# Patient Record
Sex: FEMALE | Race: WHITE | NOT HISPANIC OR LATINO | Employment: UNEMPLOYED | URBAN - NONMETROPOLITAN AREA
[De-identification: names, ages, dates, MRNs, and addresses within clinical notes are randomized per-mention and may not be internally consistent; named-entity substitution may affect disease eponyms.]

---

## 2020-12-29 ENCOUNTER — HOSPITAL ENCOUNTER (OUTPATIENT)
Dept: LAB | Facility: MEDICAL CENTER | Age: 25
End: 2020-12-29
Attending: REGISTERED NURSE
Payer: MEDICAID

## 2020-12-29 LAB
ALBUMIN SERPL BCP-MCNC: 4.4 G/DL (ref 3.2–4.9)
ALBUMIN/GLOB SERPL: 1.6 G/DL
ALP SERPL-CCNC: 86 U/L (ref 30–99)
ALT SERPL-CCNC: 12 U/L (ref 2–50)
ANION GAP SERPL CALC-SCNC: 9 MMOL/L (ref 7–16)
AST SERPL-CCNC: 13 U/L (ref 12–45)
BASOPHILS # BLD AUTO: 0.4 % (ref 0–1.8)
BASOPHILS # BLD: 0.03 K/UL (ref 0–0.12)
BILIRUB SERPL-MCNC: 0.3 MG/DL (ref 0.1–1.5)
BUN SERPL-MCNC: 10 MG/DL (ref 8–22)
CALCIUM SERPL-MCNC: 9.3 MG/DL (ref 8.5–10.5)
CHLORIDE SERPL-SCNC: 107 MMOL/L (ref 96–112)
CHOLEST SERPL-MCNC: 183 MG/DL (ref 100–199)
CO2 SERPL-SCNC: 25 MMOL/L (ref 20–33)
CREAT SERPL-MCNC: 0.77 MG/DL (ref 0.5–1.4)
EOSINOPHIL # BLD AUTO: 0.13 K/UL (ref 0–0.51)
EOSINOPHIL NFR BLD: 1.9 % (ref 0–6.9)
ERYTHROCYTE [DISTWIDTH] IN BLOOD BY AUTOMATED COUNT: 42 FL (ref 35.9–50)
FASTING STATUS PATIENT QL REPORTED: NORMAL
GLOBULIN SER CALC-MCNC: 2.7 G/DL (ref 1.9–3.5)
GLUCOSE SERPL-MCNC: 78 MG/DL (ref 65–99)
HCT VFR BLD AUTO: 40.7 % (ref 37–47)
HDLC SERPL-MCNC: 42 MG/DL
HGB BLD-MCNC: 13.5 G/DL (ref 12–16)
IMM GRANULOCYTES # BLD AUTO: 0.01 K/UL (ref 0–0.11)
IMM GRANULOCYTES NFR BLD AUTO: 0.1 % (ref 0–0.9)
LDLC SERPL CALC-MCNC: 99 MG/DL
LYMPHOCYTES # BLD AUTO: 1.81 K/UL (ref 1–4.8)
LYMPHOCYTES NFR BLD: 26.3 % (ref 22–41)
MCH RBC QN AUTO: 29.7 PG (ref 27–33)
MCHC RBC AUTO-ENTMCNC: 33.2 G/DL (ref 33.6–35)
MCV RBC AUTO: 89.5 FL (ref 81.4–97.8)
MONOCYTES # BLD AUTO: 0.48 K/UL (ref 0–0.85)
MONOCYTES NFR BLD AUTO: 7 % (ref 0–13.4)
NEUTROPHILS # BLD AUTO: 4.42 K/UL (ref 2–7.15)
NEUTROPHILS NFR BLD: 64.3 % (ref 44–72)
NRBC # BLD AUTO: 0 K/UL
NRBC BLD-RTO: 0 /100 WBC
PLATELET # BLD AUTO: 268 K/UL (ref 164–446)
PMV BLD AUTO: 10.1 FL (ref 9–12.9)
POTASSIUM SERPL-SCNC: 4.4 MMOL/L (ref 3.6–5.5)
PROT SERPL-MCNC: 7.1 G/DL (ref 6–8.2)
RBC # BLD AUTO: 4.55 M/UL (ref 4.2–5.4)
SODIUM SERPL-SCNC: 141 MMOL/L (ref 135–145)
TRIGL SERPL-MCNC: 212 MG/DL (ref 0–149)
TSH SERPL DL<=0.005 MIU/L-ACNC: 2.37 UIU/ML (ref 0.38–5.33)
WBC # BLD AUTO: 6.9 K/UL (ref 4.8–10.8)

## 2020-12-29 PROCEDURE — 85025 COMPLETE CBC W/AUTO DIFF WBC: CPT

## 2020-12-29 PROCEDURE — 84443 ASSAY THYROID STIM HORMONE: CPT

## 2020-12-29 PROCEDURE — 36415 COLL VENOUS BLD VENIPUNCTURE: CPT

## 2020-12-29 PROCEDURE — 80061 LIPID PANEL: CPT

## 2020-12-29 PROCEDURE — 80053 COMPREHEN METABOLIC PANEL: CPT

## 2020-12-30 ENCOUNTER — HOSPITAL ENCOUNTER (INPATIENT)
Dept: HOSPITAL 8 - 3E | Age: 25
LOS: 11 days | Discharge: HOME | DRG: 753 | End: 2021-01-10
Attending: PSYCHIATRY & NEUROLOGY | Admitting: PSYCHIATRY & NEUROLOGY
Payer: MEDICAID

## 2020-12-30 VITALS — DIASTOLIC BLOOD PRESSURE: 85 MMHG | SYSTOLIC BLOOD PRESSURE: 139 MMHG

## 2020-12-30 VITALS — DIASTOLIC BLOOD PRESSURE: 75 MMHG | SYSTOLIC BLOOD PRESSURE: 105 MMHG

## 2020-12-30 VITALS — WEIGHT: 174.83 LBS | BODY MASS INDEX: 23.68 KG/M2 | HEIGHT: 72 IN

## 2020-12-30 DIAGNOSIS — R45.851: ICD-10-CM

## 2020-12-30 DIAGNOSIS — G47.00: ICD-10-CM

## 2020-12-30 DIAGNOSIS — E78.5: ICD-10-CM

## 2020-12-30 DIAGNOSIS — F41.1: ICD-10-CM

## 2020-12-30 DIAGNOSIS — F31.30: Primary | ICD-10-CM

## 2020-12-30 DIAGNOSIS — Z59.0: ICD-10-CM

## 2020-12-30 DIAGNOSIS — Z79.899: ICD-10-CM

## 2020-12-30 DIAGNOSIS — J45.909: ICD-10-CM

## 2020-12-30 LAB — MICROSCOPIC: (no result)

## 2020-12-30 PROCEDURE — 36415 COLL VENOUS BLD VENIPUNCTURE: CPT

## 2020-12-30 PROCEDURE — 71045 X-RAY EXAM CHEST 1 VIEW: CPT

## 2020-12-30 PROCEDURE — 84443 ASSAY THYROID STIM HORMONE: CPT

## 2020-12-30 PROCEDURE — 80061 LIPID PANEL: CPT

## 2020-12-30 PROCEDURE — 93005 ELECTROCARDIOGRAM TRACING: CPT

## 2020-12-30 PROCEDURE — 81001 URINALYSIS AUTO W/SCOPE: CPT

## 2020-12-30 PROCEDURE — 84439 ASSAY OF FREE THYROXINE: CPT

## 2020-12-30 PROCEDURE — 87086 URINE CULTURE/COLONY COUNT: CPT

## 2020-12-30 RX ADMIN — QUETIAPINE FUMARATE SCH MG: 100 TABLET ORAL at 20:57

## 2020-12-30 SDOH — ECONOMIC STABILITY - HOUSING INSECURITY: HOMELESSNESS: Z59.0

## 2020-12-31 VITALS — DIASTOLIC BLOOD PRESSURE: 77 MMHG | SYSTOLIC BLOOD PRESSURE: 108 MMHG

## 2020-12-31 VITALS — DIASTOLIC BLOOD PRESSURE: 64 MMHG | SYSTOLIC BLOOD PRESSURE: 93 MMHG

## 2020-12-31 LAB
CHOL/HDL RATIO: 3.9
HDL CHOL %: 25 % (ref 28–40)
HDL CHOLESTEROL (DIRECT): 51 MG/DL (ref 40–60)
LDL CHOLESTEROL,CALCULATED: 125 MG/DL (ref 54–169)
LDLC/HDLC SERPL: 2.5 {RATIO} (ref 0.5–3)
T4 FREE SERPL-MCNC: 0.84 NG/DL (ref 0.76–1.46)
TRIGL SERPL-MCNC: 126 MG/DL (ref 50–200)
VLDLC SERPL CALC-MCNC: 25 MG/DL (ref 0–25)

## 2020-12-31 RX ADMIN — QUETIAPINE FUMARATE SCH MG: 100 TABLET ORAL at 20:15

## 2020-12-31 RX ADMIN — HYDROXYZINE HYDROCHLORIDE PRN MG: 10 TABLET ORAL at 20:15

## 2020-12-31 RX ADMIN — ATORVASTATIN CALCIUM SCH MG: 10 TABLET, FILM COATED ORAL at 20:15

## 2021-01-01 VITALS — SYSTOLIC BLOOD PRESSURE: 104 MMHG | DIASTOLIC BLOOD PRESSURE: 71 MMHG

## 2021-01-01 VITALS — SYSTOLIC BLOOD PRESSURE: 108 MMHG | DIASTOLIC BLOOD PRESSURE: 76 MMHG

## 2021-01-01 RX ADMIN — QUETIAPINE FUMARATE SCH MG: 100 TABLET ORAL at 20:41

## 2021-01-01 RX ADMIN — DOCUSATE SODIUM PRN MG: 100 CAPSULE, LIQUID FILLED ORAL at 08:56

## 2021-01-01 RX ADMIN — ATORVASTATIN CALCIUM SCH MG: 10 TABLET, FILM COATED ORAL at 20:41

## 2021-01-01 RX ADMIN — HYDROXYZINE HYDROCHLORIDE PRN MG: 10 TABLET ORAL at 20:41

## 2021-01-01 RX ADMIN — CETIRIZINE HYDROCHLORIDE SCH MG: 10 TABLET, FILM COATED ORAL at 08:56

## 2021-01-02 VITALS — SYSTOLIC BLOOD PRESSURE: 96 MMHG | DIASTOLIC BLOOD PRESSURE: 60 MMHG

## 2021-01-02 VITALS — SYSTOLIC BLOOD PRESSURE: 116 MMHG | DIASTOLIC BLOOD PRESSURE: 82 MMHG

## 2021-01-02 RX ADMIN — SERTRALINE HYDROCHLORIDE SCH MG: 50 TABLET ORAL at 16:15

## 2021-01-02 RX ADMIN — ATORVASTATIN CALCIUM SCH MG: 10 TABLET, FILM COATED ORAL at 20:33

## 2021-01-02 RX ADMIN — CETIRIZINE HYDROCHLORIDE SCH MG: 10 TABLET, FILM COATED ORAL at 08:42

## 2021-01-02 RX ADMIN — HYDROXYZINE HYDROCHLORIDE PRN MG: 10 TABLET ORAL at 20:33

## 2021-01-03 VITALS — DIASTOLIC BLOOD PRESSURE: 69 MMHG | SYSTOLIC BLOOD PRESSURE: 100 MMHG

## 2021-01-03 VITALS — SYSTOLIC BLOOD PRESSURE: 100 MMHG | DIASTOLIC BLOOD PRESSURE: 68 MMHG

## 2021-01-03 RX ADMIN — HYDROXYZINE HYDROCHLORIDE PRN MG: 10 TABLET ORAL at 17:18

## 2021-01-03 RX ADMIN — CETIRIZINE HYDROCHLORIDE SCH MG: 10 TABLET, FILM COATED ORAL at 09:27

## 2021-01-03 RX ADMIN — SERTRALINE HYDROCHLORIDE SCH MG: 50 TABLET ORAL at 09:27

## 2021-01-03 RX ADMIN — ATORVASTATIN CALCIUM SCH MG: 10 TABLET, FILM COATED ORAL at 20:32

## 2021-01-04 VITALS — DIASTOLIC BLOOD PRESSURE: 80 MMHG | SYSTOLIC BLOOD PRESSURE: 116 MMHG

## 2021-01-04 RX ADMIN — CETIRIZINE HYDROCHLORIDE SCH MG: 10 TABLET, FILM COATED ORAL at 08:37

## 2021-01-04 RX ADMIN — SERTRALINE HYDROCHLORIDE SCH MG: 50 TABLET ORAL at 08:37

## 2021-01-04 RX ADMIN — ATORVASTATIN CALCIUM SCH MG: 10 TABLET, FILM COATED ORAL at 20:35

## 2021-01-05 VITALS — DIASTOLIC BLOOD PRESSURE: 62 MMHG | SYSTOLIC BLOOD PRESSURE: 98 MMHG

## 2021-01-05 VITALS — DIASTOLIC BLOOD PRESSURE: 73 MMHG | SYSTOLIC BLOOD PRESSURE: 108 MMHG

## 2021-01-05 RX ADMIN — CETIRIZINE HYDROCHLORIDE SCH MG: 10 TABLET, FILM COATED ORAL at 08:38

## 2021-01-05 RX ADMIN — ATORVASTATIN CALCIUM SCH MG: 10 TABLET, FILM COATED ORAL at 20:57

## 2021-01-05 RX ADMIN — DOCUSATE SODIUM PRN MG: 100 CAPSULE, LIQUID FILLED ORAL at 20:57

## 2021-01-06 VITALS — SYSTOLIC BLOOD PRESSURE: 114 MMHG | DIASTOLIC BLOOD PRESSURE: 79 MMHG

## 2021-01-06 VITALS — SYSTOLIC BLOOD PRESSURE: 99 MMHG | DIASTOLIC BLOOD PRESSURE: 64 MMHG

## 2021-01-06 RX ADMIN — DOCUSATE SODIUM PRN MG: 100 CAPSULE, LIQUID FILLED ORAL at 19:49

## 2021-01-06 RX ADMIN — ATORVASTATIN CALCIUM SCH MG: 10 TABLET, FILM COATED ORAL at 19:49

## 2021-01-06 RX ADMIN — SERTRALINE HYDROCHLORIDE SCH MG: 50 TABLET ORAL at 08:17

## 2021-01-06 RX ADMIN — CETIRIZINE HYDROCHLORIDE SCH MG: 10 TABLET, FILM COATED ORAL at 08:17

## 2021-01-06 RX ADMIN — ARIPIPRAZOLE SCH MG: 2 TABLET ORAL at 08:17

## 2021-01-07 VITALS — DIASTOLIC BLOOD PRESSURE: 70 MMHG | SYSTOLIC BLOOD PRESSURE: 106 MMHG

## 2021-01-07 VITALS — SYSTOLIC BLOOD PRESSURE: 100 MMHG | DIASTOLIC BLOOD PRESSURE: 69 MMHG

## 2021-01-07 RX ADMIN — ARIPIPRAZOLE SCH MG: 2 TABLET ORAL at 08:55

## 2021-01-07 RX ADMIN — CETIRIZINE HYDROCHLORIDE SCH MG: 10 TABLET, FILM COATED ORAL at 08:56

## 2021-01-07 RX ADMIN — ATORVASTATIN CALCIUM SCH MG: 10 TABLET, FILM COATED ORAL at 20:10

## 2021-01-07 RX ADMIN — SERTRALINE HYDROCHLORIDE SCH MG: 50 TABLET ORAL at 08:56

## 2021-01-07 RX ADMIN — Medication SCH MG: at 20:11

## 2021-01-08 VITALS — DIASTOLIC BLOOD PRESSURE: 70 MMHG | SYSTOLIC BLOOD PRESSURE: 107 MMHG

## 2021-01-08 VITALS — DIASTOLIC BLOOD PRESSURE: 75 MMHG | SYSTOLIC BLOOD PRESSURE: 112 MMHG

## 2021-01-08 RX ADMIN — Medication SCH MG: at 21:01

## 2021-01-08 RX ADMIN — HYDROXYZINE HYDROCHLORIDE PRN MG: 10 TABLET ORAL at 21:01

## 2021-01-08 RX ADMIN — ATORVASTATIN CALCIUM SCH MG: 10 TABLET, FILM COATED ORAL at 21:01

## 2021-01-08 RX ADMIN — SERTRALINE HYDROCHLORIDE SCH MG: 50 TABLET ORAL at 08:36

## 2021-01-08 RX ADMIN — CETIRIZINE HYDROCHLORIDE SCH MG: 10 TABLET, FILM COATED ORAL at 08:36

## 2021-01-08 RX ADMIN — ARIPIPRAZOLE SCH MG: 2 TABLET ORAL at 08:36

## 2021-01-09 VITALS — DIASTOLIC BLOOD PRESSURE: 70 MMHG | SYSTOLIC BLOOD PRESSURE: 105 MMHG

## 2021-01-09 VITALS — DIASTOLIC BLOOD PRESSURE: 73 MMHG | SYSTOLIC BLOOD PRESSURE: 111 MMHG

## 2021-01-09 RX ADMIN — ARIPIPRAZOLE SCH MG: 2 TABLET ORAL at 08:42

## 2021-01-09 RX ADMIN — ATORVASTATIN CALCIUM SCH MG: 10 TABLET, FILM COATED ORAL at 20:09

## 2021-01-09 RX ADMIN — CETIRIZINE HYDROCHLORIDE SCH MG: 10 TABLET, FILM COATED ORAL at 08:42

## 2021-01-09 RX ADMIN — HYDROXYZINE HYDROCHLORIDE PRN MG: 10 TABLET ORAL at 20:09

## 2021-01-09 RX ADMIN — Medication SCH MG: at 20:09

## 2021-01-09 RX ADMIN — SERTRALINE HYDROCHLORIDE SCH MG: 50 TABLET ORAL at 08:42

## 2021-01-10 VITALS — SYSTOLIC BLOOD PRESSURE: 100 MMHG | DIASTOLIC BLOOD PRESSURE: 66 MMHG

## 2021-01-10 RX ADMIN — ARIPIPRAZOLE SCH MG: 2 TABLET ORAL at 08:39

## 2021-01-10 RX ADMIN — CETIRIZINE HYDROCHLORIDE SCH MG: 10 TABLET, FILM COATED ORAL at 08:39

## 2021-01-10 RX ADMIN — SERTRALINE HYDROCHLORIDE SCH MG: 50 TABLET ORAL at 08:39

## 2021-03-02 ENCOUNTER — APPOINTMENT (OUTPATIENT)
Dept: RADIOLOGY | Facility: MEDICAL CENTER | Age: 26
End: 2021-03-02
Attending: EMERGENCY MEDICINE
Payer: MEDICAID

## 2021-03-02 ENCOUNTER — HOSPITAL ENCOUNTER (EMERGENCY)
Facility: MEDICAL CENTER | Age: 26
End: 2021-03-02
Attending: EMERGENCY MEDICINE
Payer: MEDICAID

## 2021-03-02 VITALS
WEIGHT: 187.39 LBS | OXYGEN SATURATION: 100 % | SYSTOLIC BLOOD PRESSURE: 100 MMHG | HEIGHT: 72 IN | DIASTOLIC BLOOD PRESSURE: 59 MMHG | TEMPERATURE: 99 F | HEART RATE: 76 BPM | BODY MASS INDEX: 25.38 KG/M2 | RESPIRATION RATE: 16 BRPM

## 2021-03-02 DIAGNOSIS — R10.11 RIGHT UPPER QUADRANT ABDOMINAL PAIN: ICD-10-CM

## 2021-03-02 LAB
ALBUMIN SERPL BCP-MCNC: 4.4 G/DL (ref 3.2–4.9)
ALBUMIN/GLOB SERPL: 1.5 G/DL
ALP SERPL-CCNC: 69 U/L (ref 30–99)
ALT SERPL-CCNC: 7 U/L (ref 2–50)
ANION GAP SERPL CALC-SCNC: 12 MMOL/L (ref 7–16)
APPEARANCE UR: ABNORMAL
AST SERPL-CCNC: 10 U/L (ref 12–45)
BACTERIA #/AREA URNS HPF: ABNORMAL /HPF
BASOPHILS # BLD AUTO: 0.2 % (ref 0–1.8)
BASOPHILS # BLD: 0.03 K/UL (ref 0–0.12)
BILIRUB SERPL-MCNC: 0.5 MG/DL (ref 0.1–1.5)
BILIRUB UR QL STRIP.AUTO: ABNORMAL
BUN SERPL-MCNC: 13 MG/DL (ref 8–22)
CALCIUM SERPL-MCNC: 9.5 MG/DL (ref 8.5–10.5)
CHLORIDE SERPL-SCNC: 103 MMOL/L (ref 96–112)
CO2 SERPL-SCNC: 23 MMOL/L (ref 20–33)
COLOR UR: ABNORMAL
CREAT SERPL-MCNC: 0.71 MG/DL (ref 0.5–1.4)
EOSINOPHIL # BLD AUTO: 0.03 K/UL (ref 0–0.51)
EOSINOPHIL NFR BLD: 0.2 % (ref 0–6.9)
EPI CELLS #/AREA URNS HPF: ABNORMAL /HPF
ERYTHROCYTE [DISTWIDTH] IN BLOOD BY AUTOMATED COUNT: 41.8 FL (ref 35.9–50)
GLOBULIN SER CALC-MCNC: 3 G/DL (ref 1.9–3.5)
GLUCOSE SERPL-MCNC: 85 MG/DL (ref 65–99)
GLUCOSE UR STRIP.AUTO-MCNC: NEGATIVE MG/DL
HCG SERPL QL: NEGATIVE
HCT VFR BLD AUTO: 39.9 % (ref 37–47)
HGB BLD-MCNC: 13.2 G/DL (ref 12–16)
HYALINE CASTS #/AREA URNS LPF: ABNORMAL /LPF
IMM GRANULOCYTES # BLD AUTO: 0.04 K/UL (ref 0–0.11)
IMM GRANULOCYTES NFR BLD AUTO: 0.3 % (ref 0–0.9)
KETONES UR STRIP.AUTO-MCNC: 80 MG/DL
LEUKOCYTE ESTERASE UR QL STRIP.AUTO: ABNORMAL
LIPASE SERPL-CCNC: 22 U/L (ref 11–82)
LYMPHOCYTES # BLD AUTO: 1.31 K/UL (ref 1–4.8)
LYMPHOCYTES NFR BLD: 10.2 % (ref 22–41)
MCH RBC QN AUTO: 29.5 PG (ref 27–33)
MCHC RBC AUTO-ENTMCNC: 33.1 G/DL (ref 33.6–35)
MCV RBC AUTO: 89.1 FL (ref 81.4–97.8)
MICRO URNS: ABNORMAL
MONOCYTES # BLD AUTO: 0.73 K/UL (ref 0–0.85)
MONOCYTES NFR BLD AUTO: 5.7 % (ref 0–13.4)
NEUTROPHILS # BLD AUTO: 10.69 K/UL (ref 2–7.15)
NEUTROPHILS NFR BLD: 83.4 % (ref 44–72)
NITRITE UR QL STRIP.AUTO: NEGATIVE
NRBC # BLD AUTO: 0 K/UL
NRBC BLD-RTO: 0 /100 WBC
PH UR STRIP.AUTO: 5 [PH] (ref 5–8)
PLATELET # BLD AUTO: 218 K/UL (ref 164–446)
PMV BLD AUTO: 9.9 FL (ref 9–12.9)
POTASSIUM SERPL-SCNC: 4.4 MMOL/L (ref 3.6–5.5)
PROT SERPL-MCNC: 7.4 G/DL (ref 6–8.2)
PROT UR QL STRIP: 300 MG/DL
RBC # BLD AUTO: 4.48 M/UL (ref 4.2–5.4)
RBC # URNS HPF: ABNORMAL /HPF
RBC UR QL AUTO: ABNORMAL
SODIUM SERPL-SCNC: 138 MMOL/L (ref 135–145)
SP GR UR STRIP.AUTO: 1.04
UROBILINOGEN UR STRIP.AUTO-MCNC: 0.2 MG/DL
WBC # BLD AUTO: 12.8 K/UL (ref 4.8–10.8)
WBC #/AREA URNS HPF: ABNORMAL /HPF

## 2021-03-02 PROCEDURE — 80053 COMPREHEN METABOLIC PANEL: CPT

## 2021-03-02 PROCEDURE — 96375 TX/PRO/DX INJ NEW DRUG ADDON: CPT

## 2021-03-02 PROCEDURE — 87086 URINE CULTURE/COLONY COUNT: CPT

## 2021-03-02 PROCEDURE — 96374 THER/PROPH/DIAG INJ IV PUSH: CPT | Mod: XU

## 2021-03-02 PROCEDURE — 700117 HCHG RX CONTRAST REV CODE 255: Performed by: EMERGENCY MEDICINE

## 2021-03-02 PROCEDURE — 700105 HCHG RX REV CODE 258: Performed by: EMERGENCY MEDICINE

## 2021-03-02 PROCEDURE — 36415 COLL VENOUS BLD VENIPUNCTURE: CPT

## 2021-03-02 PROCEDURE — 84703 CHORIONIC GONADOTROPIN ASSAY: CPT

## 2021-03-02 PROCEDURE — 74177 CT ABD & PELVIS W/CONTRAST: CPT

## 2021-03-02 PROCEDURE — 83690 ASSAY OF LIPASE: CPT

## 2021-03-02 PROCEDURE — 76705 ECHO EXAM OF ABDOMEN: CPT

## 2021-03-02 PROCEDURE — 700111 HCHG RX REV CODE 636 W/ 250 OVERRIDE (IP): Performed by: EMERGENCY MEDICINE

## 2021-03-02 PROCEDURE — 81001 URINALYSIS AUTO W/SCOPE: CPT

## 2021-03-02 PROCEDURE — 85025 COMPLETE CBC W/AUTO DIFF WBC: CPT

## 2021-03-02 PROCEDURE — 99285 EMERGENCY DEPT VISIT HI MDM: CPT

## 2021-03-02 RX ORDER — MORPHINE SULFATE 4 MG/ML
2 INJECTION, SOLUTION INTRAMUSCULAR; INTRAVENOUS
Status: DISCONTINUED | OUTPATIENT
Start: 2021-03-02 | End: 2021-03-02 | Stop reason: HOSPADM

## 2021-03-02 RX ORDER — SODIUM CHLORIDE 9 MG/ML
1000 INJECTION, SOLUTION INTRAVENOUS ONCE
Status: COMPLETED | OUTPATIENT
Start: 2021-03-02 | End: 2021-03-02

## 2021-03-02 RX ORDER — ONDANSETRON 4 MG/1
4 TABLET, ORALLY DISINTEGRATING ORAL EVERY 8 HOURS PRN
Qty: 12 TABLET | Refills: 0 | Status: SHIPPED | OUTPATIENT
Start: 2021-03-02 | End: 2021-09-11

## 2021-03-02 RX ORDER — ONDANSETRON 2 MG/ML
4 INJECTION INTRAMUSCULAR; INTRAVENOUS
Status: DISCONTINUED | OUTPATIENT
Start: 2021-03-02 | End: 2021-03-02 | Stop reason: HOSPADM

## 2021-03-02 RX ORDER — SODIUM CHLORIDE 9 MG/ML
INJECTION, SOLUTION INTRAVENOUS CONTINUOUS
Status: DISCONTINUED | OUTPATIENT
Start: 2021-03-02 | End: 2021-03-02 | Stop reason: HOSPADM

## 2021-03-02 RX ORDER — IBUPROFEN 600 MG/1
600 TABLET ORAL
Qty: 20 TABLET | Refills: 0 | Status: SHIPPED | OUTPATIENT
Start: 2021-03-02 | End: 2021-09-11

## 2021-03-02 RX ADMIN — SODIUM CHLORIDE 1000 ML: 9 INJECTION, SOLUTION INTRAVENOUS at 17:07

## 2021-03-02 RX ADMIN — SODIUM CHLORIDE: 9 INJECTION, SOLUTION INTRAVENOUS at 20:21

## 2021-03-02 RX ADMIN — MORPHINE SULFATE 2 MG: 4 INJECTION INTRAVENOUS at 17:05

## 2021-03-02 RX ADMIN — IOHEXOL 90 ML: 350 INJECTION, SOLUTION INTRAVENOUS at 19:26

## 2021-03-02 RX ADMIN — ONDANSETRON 4 MG: 2 INJECTION INTRAMUSCULAR; INTRAVENOUS at 17:05

## 2021-03-02 ASSESSMENT — LIFESTYLE VARIABLES
DO YOU DRINK ALCOHOL: NO
DOES PATIENT WANT TO STOP DRINKING: NO

## 2021-03-02 ASSESSMENT — FIBROSIS 4 INDEX: FIB4 SCORE: 0.35

## 2021-03-02 NOTE — ED TRIAGE NOTES
"Mariel Carty  Chief Complaint   Patient presents with   • Abdominal Pain     Pt bib ems from [facility] with a complaint of umbilical abd pain since yesterday. Pt endorses nausea, but denies N/D. Pt states that she does not think she is pregant. Pt reveived 1000mg APAP and 600MG IBU pta.      Pt wheeled to triage with above complaint. Pt able to step up onto scale with no difficulty.     /89   Pulse 68   Temp 36.7 °C (98 °F) (Temporal)   Resp 16   Ht 1.854 m (6' 1\")   Wt 85 kg (187 lb 6.3 oz)   SpO2 97%   BMI 24.72 kg/m²     Pt informed of triage process and encouraged to notify staff of any changes or concerns. Pt verbalized understanding of instructions. Apologized for long wait time. Pt placed back in lobby.     "

## 2021-03-03 NOTE — ED PROVIDER NOTES
"ED Provider Note    CHIEF COMPLAINT  Chief Complaint   Patient presents with   • Abdominal Pain     Pt bib ems from [facility] with a complaint of umbilical abd pain since yesterday. Pt endorses nausea, but denies N/D. Pt states that she does not think she is pregant. Pt reveived 1000mg APAP and 600MG IBU pta.        HPI  Mariel Carty is a 25 y.o. female who presents complaining of belly pain.  Started last night.  She localizes it around her bellybutton.  She has had this before but nothing has lasted this long.  No clear alleviating or exacerbating factor.  She did receive some Tylenol ibuprofen prior to arrival this is helped a little bit.  She has some nausea and has not really wanted to eat.  There is no vomiting.  Feels it hurts on the inside.  It started slowly and got progressively worse.  It is constant and noncolicky.  She denies any change in bowel or bladder.  Does not think that she is pregnant.  Her menstrual cycles a little bit heavier than typical for her of late.  She is currently on her menstrual cycle.  There is no other vaginal discharge.  There is no fever.  No cough or cold symptoms or sick contacts.  There is no other complaint.    PAST MEDICAL HISTORY  None    FAMILY HISTORY  History reviewed. No pertinent family history.    SOCIAL HISTORY  Social History     Tobacco Use   • Smoking status: Never Smoker   • Smokeless tobacco: Never Used   Substance Use Topics   • Alcohol use: Not Currently   • Drug use: Not Currently         SURGICAL HISTORY  History reviewed. No pertinent surgical history.    CURRENT MEDICATIONS  As above  I have reviewed the nurses notes and/or the list brought with the patient.    ALLERGIES  No Known Allergies    REVIEW OF SYSTEMS  See HPI for further details. Review of systems as above, otherwise all other systems are negative.     PHYSICAL EXAM  VITAL SIGNS: /89   Pulse 68   Temp 36.7 °C (98 °F) (Temporal)   Resp 16   Ht 1.854 m (6' 1\")   Wt 85 kg " (187 lb 6.3 oz)   SpO2 97%   BMI 24.72 kg/m²     Constitutional: Well appearing patient in no acute distress.  Not toxic, nor ill in appearance.  HENT: Mucus membranes moist.  Oropharynx is clear.  Eyes: Pupils equally round.  No scleral icterus.   Neck: Full nontender range of motion.  Lymphatic: No cervical lymphadenopathy noted.   Cardiovascular: Regular heart rate and rhythm.  No murmurs, rubs, nor gallop appreciated.   Thorax & Lungs: Chest is nontender.  Lungs are clear to auscultation with good air movement bilaterally.  No wheeze, rhonchi, nor rales.   Abdomen: Soft, with mild, focal tenderness in the right upper quadrant however there is no rebound nor guarding.  Positive clinical Mccray sign.  No hepatosplenomegaly appreciated.  No mass, specifically no hernia.  There is no tenderness around her umbilicus.  Skin: No purpura nor petechia noted.  Extremities/Musculoskeletal: No sign of trauma.    Neurologic: Alert & oriented.  Strength and sensation is intact all around.  Gait is normal.  Psychiatric: Normal affect appropriate for the clinical situation.    LABS  Labs Reviewed   COMP METABOLIC PANEL - Abnormal; Notable for the following components:       Result Value    AST(SGOT) 10 (*)     All other components within normal limits   CBC WITH DIFFERENTIAL - Abnormal; Notable for the following components:    WBC 12.8 (*)     MCHC 33.1 (*)     Neutrophils-Polys 83.40 (*)     Lymphocytes 10.20 (*)     Neutrophils (Absolute) 10.69 (*)     All other components within normal limits    Narrative:     SPECIMEN IS A RECOLLECT. CLOTTED   LIPASE   HCG QUAL SERUM   ESTIMATED GFR   URINALYSIS,CULTURE IF INDICATED         RADIOLOGY/PROCEDURES  I have reviewed the patient's film interpretations myself, and they are read out by the radiologist as:  CT-ABDOMEN-PELVIS WITH   Final Result      1.  There is no evidence of an acute inflammatory process in the abdomen or pelvis.   2.  There is no bowel obstruction.   3.  There  is some fluid in the vaginal vault.      US-RUQ   Final Result      1.  Common bile duct is mildly dilated measuring 5.1 mm. No intrahepatic biliary ductal dilatation is noted.      2.  The gallbladder appears normal with no gallstones identified.            MEDICAL RECORD  I have reviewed patient's medical record and pertinent results are listed above.    COURSE & MEDICAL DECISION MAKING  I have reviewed any medical record information, laboratory studies and radiographic results as noted above.  Patient presents with periumbilical pain which began last night.  She is had similar symptoms in the past but nothing has lasted this long.  On exam, she has no tenderness over McBurney's but she does have an equivocal Mccray sign.    IVF initiated the patient is kept n.p.o.  Due to possible surgical candidate.    Laboratories returned showing a leukocytosis.  Preponderance of neutrophils but no evidence of bandemia.  She is not pregnant.  There is no pancreatitis or hepatitis.  Urine shows blood, no obvious evidence of infection.  Her sonogram return showing mildly dilated common bile duct but no other abnormalities.  Specifically no evidence of stones, gallbladder wall thickening or pericholecystic fluid.  Upon recheck, she still mildly tender.    Went ahead and proceed with a CT scan.  This shows no evidence of an acute inflammatory changes.  There is fluid in the vaginal vault, this is consistent with her history of menstruation.    Went back to check the patient again.  I do see the trend of her blood pressures.  When asked about this, she laughs and states that she is always told that.  I am not sure what to make of that measurement of 70.  I rechecked it myself and it is 87 systolic with a pulse rate in the 60s.  She is not symptomatic at all.  With respect to her belly, she states that it is feeling better, it is eased off.  In retrospect, she does wonder if this could be related to her menstrual cycle as this feels  somewhat similar in the past.  She would like to go home.    At this point I discussed with her not sure what the cause of her symptoms are.  To her point, could be related to her menstrual cycle.  However, without leukocytosis, I have asked her to return tomorrow she is not feeling better.  We will go ahead and give her Zofran for nausea and ibuprofen for pain/inflammation.  However it seems like she does not need either of these things at this time.    She is given instructions on belly pain, nonspecific as well as the following instructions:I am not sure what is causing her pain.  This could be from your period.  However, it could also be from something else and it is just too early for me to figure it out.  At this point, rest tonight, plenty of fluids.  Use Zofran if needed for nausea, ibuprofen if needed for pain.  Return to the ER tomorrow morning if you are not feeling better so that we may recheck you.      FINAL IMPRESSION  1. Right upper quadrant abdominal pain         This dictation was created using voice recognition software.    Electronically signed by: Rusty Simon M.D., 3/2/2021 4:36 PM

## 2021-03-03 NOTE — DISCHARGE INSTRUCTIONS
I am not sure what is causing her pain.  This could be from your period.  However, it could also be from something else and it is just too early for me to figure it out.  At this point, rest tonight, plenty of fluids.  Use Zofran if needed for nausea, ibuprofen if needed for pain.  Return to the ER tomorrow morning if you are not feeling better so that we may recheck you.

## 2021-03-05 LAB
BACTERIA UR CULT: NORMAL
SIGNIFICANT IND 70042: NORMAL
SITE SITE: NORMAL
SOURCE SOURCE: NORMAL

## 2021-06-05 ENCOUNTER — HOSPITAL ENCOUNTER (OUTPATIENT)
Dept: RADIOLOGY | Facility: MEDICAL CENTER | Age: 26
End: 2021-06-05
Attending: PSYCHIATRY & NEUROLOGY
Payer: MEDICAID

## 2021-06-05 DIAGNOSIS — E78.2 MIXED HYPERLIPIDEMIA: ICD-10-CM

## 2021-06-05 DIAGNOSIS — R20.2 PARESTHESIA: ICD-10-CM

## 2021-06-05 DIAGNOSIS — R25.1 TREMOR: ICD-10-CM

## 2021-06-05 PROCEDURE — 70551 MRI BRAIN STEM W/O DYE: CPT

## 2021-09-11 ENCOUNTER — HOSPITAL ENCOUNTER (EMERGENCY)
Facility: MEDICAL CENTER | Age: 26
End: 2021-09-12
Attending: EMERGENCY MEDICINE
Payer: MEDICAID

## 2021-09-11 DIAGNOSIS — R45.89 DEPRESSED MOOD: ICD-10-CM

## 2021-09-11 DIAGNOSIS — T14.91XA SUICIDE ATTEMPT (HCC): ICD-10-CM

## 2021-09-11 DIAGNOSIS — T50.902A INTENTIONAL DRUG OVERDOSE, INITIAL ENCOUNTER (HCC): ICD-10-CM

## 2021-09-11 LAB
ALBUMIN SERPL BCP-MCNC: 4.3 G/DL (ref 3.2–4.9)
ALBUMIN/GLOB SERPL: 1.4 G/DL
ALP SERPL-CCNC: 87 U/L (ref 30–99)
ALT SERPL-CCNC: 7 U/L (ref 2–50)
AMPHET UR QL SCN: NEGATIVE
ANION GAP SERPL CALC-SCNC: 11 MMOL/L (ref 7–16)
APAP SERPL-MCNC: <5 UG/ML (ref 10–30)
AST SERPL-CCNC: 15 U/L (ref 12–45)
BARBITURATES UR QL SCN: NEGATIVE
BASOPHILS # BLD AUTO: 0.3 % (ref 0–1.8)
BASOPHILS # BLD: 0.03 K/UL (ref 0–0.12)
BENZODIAZ UR QL SCN: NEGATIVE
BILIRUB SERPL-MCNC: <0.2 MG/DL (ref 0.1–1.5)
BUN SERPL-MCNC: 15 MG/DL (ref 8–22)
BZE UR QL SCN: NEGATIVE
CALCIUM SERPL-MCNC: 9.2 MG/DL (ref 8.5–10.5)
CANNABINOIDS UR QL SCN: NEGATIVE
CHLORIDE SERPL-SCNC: 111 MMOL/L (ref 96–112)
CO2 SERPL-SCNC: 18 MMOL/L (ref 20–33)
CREAT SERPL-MCNC: 0.57 MG/DL (ref 0.5–1.4)
EKG IMPRESSION: NORMAL
EOSINOPHIL # BLD AUTO: 0.09 K/UL (ref 0–0.51)
EOSINOPHIL NFR BLD: 0.9 % (ref 0–6.9)
ERYTHROCYTE [DISTWIDTH] IN BLOOD BY AUTOMATED COUNT: 42.5 FL (ref 35.9–50)
ETHANOL BLD-MCNC: <10.1 MG/DL (ref 0–10)
GLOBULIN SER CALC-MCNC: 3.1 G/DL (ref 1.9–3.5)
GLUCOSE SERPL-MCNC: 105 MG/DL (ref 65–99)
HCT VFR BLD AUTO: 36.9 % (ref 37–47)
HGB BLD-MCNC: 12.5 G/DL (ref 12–16)
IMM GRANULOCYTES # BLD AUTO: 0.04 K/UL (ref 0–0.11)
IMM GRANULOCYTES NFR BLD AUTO: 0.4 % (ref 0–0.9)
LYMPHOCYTES # BLD AUTO: 1.65 K/UL (ref 1–4.8)
LYMPHOCYTES NFR BLD: 16.3 % (ref 22–41)
MCH RBC QN AUTO: 29.8 PG (ref 27–33)
MCHC RBC AUTO-ENTMCNC: 33.9 G/DL (ref 33.6–35)
MCV RBC AUTO: 88.1 FL (ref 81.4–97.8)
METHADONE UR QL SCN: NEGATIVE
MONOCYTES # BLD AUTO: 0.7 K/UL (ref 0–0.85)
MONOCYTES NFR BLD AUTO: 6.9 % (ref 0–13.4)
NEUTROPHILS # BLD AUTO: 7.63 K/UL (ref 2–7.15)
NEUTROPHILS NFR BLD: 75.2 % (ref 44–72)
NRBC # BLD AUTO: 0 K/UL
NRBC BLD-RTO: 0 /100 WBC
OPIATES UR QL SCN: NEGATIVE
OXYCODONE UR QL SCN: NEGATIVE
PCP UR QL SCN: NEGATIVE
PLATELET # BLD AUTO: 270 K/UL (ref 164–446)
PMV BLD AUTO: 9.7 FL (ref 9–12.9)
POTASSIUM SERPL-SCNC: 4.4 MMOL/L (ref 3.6–5.5)
PROPOXYPH UR QL SCN: NEGATIVE
PROT SERPL-MCNC: 7.4 G/DL (ref 6–8.2)
RBC # BLD AUTO: 4.19 M/UL (ref 4.2–5.4)
SALICYLATES SERPL-MCNC: <1 MG/DL (ref 15–25)
SODIUM SERPL-SCNC: 140 MMOL/L (ref 135–145)
WBC # BLD AUTO: 10.1 K/UL (ref 4.8–10.8)

## 2021-09-11 PROCEDURE — 80179 DRUG ASSAY SALICYLATE: CPT

## 2021-09-11 PROCEDURE — 80307 DRUG TEST PRSMV CHEM ANLYZR: CPT

## 2021-09-11 PROCEDURE — 80053 COMPREHEN METABOLIC PANEL: CPT

## 2021-09-11 PROCEDURE — 80143 DRUG ASSAY ACETAMINOPHEN: CPT

## 2021-09-11 PROCEDURE — 700102 HCHG RX REV CODE 250 W/ 637 OVERRIDE(OP): Performed by: EMERGENCY MEDICINE

## 2021-09-11 PROCEDURE — 99285 EMERGENCY DEPT VISIT HI MDM: CPT

## 2021-09-11 PROCEDURE — 93005 ELECTROCARDIOGRAM TRACING: CPT | Performed by: EMERGENCY MEDICINE

## 2021-09-11 PROCEDURE — 90791 PSYCH DIAGNOSTIC EVALUATION: CPT

## 2021-09-11 PROCEDURE — 82077 ASSAY SPEC XCP UR&BREATH IA: CPT

## 2021-09-11 PROCEDURE — 85025 COMPLETE CBC W/AUTO DIFF WBC: CPT

## 2021-09-11 PROCEDURE — 84703 CHORIONIC GONADOTROPIN ASSAY: CPT

## 2021-09-11 PROCEDURE — A9270 NON-COVERED ITEM OR SERVICE: HCPCS | Performed by: EMERGENCY MEDICINE

## 2021-09-11 RX ORDER — DICYCLOMINE HCL 20 MG
20 TABLET ORAL EVERY 6 HOURS
COMMUNITY

## 2021-09-11 RX ORDER — DIPHENHYDRAMINE HCL 25 MG
50 TABLET ORAL ONCE
Status: COMPLETED | OUTPATIENT
Start: 2021-09-11 | End: 2021-09-11

## 2021-09-11 RX ORDER — HYDROXYZINE PAMOATE 25 MG/1
25 CAPSULE ORAL 3 TIMES DAILY PRN
COMMUNITY

## 2021-09-11 RX ORDER — MIRTAZAPINE 30 MG/1
30 TABLET, FILM COATED ORAL NIGHTLY
COMMUNITY

## 2021-09-11 RX ORDER — METOPROLOL SUCCINATE 50 MG/1
50 TABLET, EXTENDED RELEASE ORAL 2 TIMES DAILY
COMMUNITY

## 2021-09-11 RX ADMIN — DIPHENHYDRAMINE HYDROCHLORIDE 50 MG: 25 TABLET ORAL at 22:19

## 2021-09-11 ASSESSMENT — FIBROSIS 4 INDEX: FIB4 SCORE: 0.43

## 2021-09-11 ASSESSMENT — LIFESTYLE VARIABLES
TOTAL SCORE: 0
DO YOU DRINK ALCOHOL: NO
EVER HAD A DRINK FIRST THING IN THE MORNING TO STEADY YOUR NERVES TO GET RID OF A HANGOVER: NO
AVERAGE NUMBER OF DAYS PER WEEK YOU HAVE A DRINK CONTAINING ALCOHOL: 0
TOTAL SCORE: 0
HAVE YOU EVER FELT YOU SHOULD CUT DOWN ON YOUR DRINKING: NO
ON A TYPICAL DAY WHEN YOU DRINK ALCOHOL HOW MANY DRINKS DO YOU HAVE: 0
EVER FELT BAD OR GUILTY ABOUT YOUR DRINKING: NO
CONSUMPTION TOTAL: NEGATIVE
HOW MANY TIMES IN THE PAST YEAR HAVE YOU HAD 5 OR MORE DRINKS IN A DAY: 0
HAVE PEOPLE ANNOYED YOU BY CRITICIZING YOUR DRINKING: NO
TOTAL SCORE: 0

## 2021-09-11 ASSESSMENT — PAIN DESCRIPTION - PAIN TYPE: TYPE: ACUTE PAIN
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
## 2021-09-11 NOTE — ED PROVIDER NOTES
ED Provider Note    CHIEF COMPLAINT  Chief Complaint   Patient presents with   • Suicidal Ideation   • Medical Clearance       [facility] [place]    Primary care provider: BLANCHE Hogue  History obtained from: Patient  History limited by: Nothing    Mariel Carty is a 25 y.o. female who presents with suicide attempt.  The patient has been homeless since January, [facility], after [facility] from [facility].  She is feeling depressed and sad, hopeless, does not feel like life is worth living, and today took at least 5 mirtazapine tablets in a suicide attempt.  No other recent illness or medical complaints.  She is supposed to be on several medications but cannot remember what they are.  Denies any other ingestion or intoxication.  No drug abuse.  No falls or injuries or trauma.  Mood has been worsening over months, no specific stressors or abuse today but she did attempt suicide.    REVIEW OF SYSTEMS  Constitutional: Negative for fever or chills.   HENT: Negative for rhinorrhea or sore throat.    Respiratory: Negative for cough or shortness of breath.    Cardiovascular: Negative for chest pain or syncope.   Gastrointestinal: Negative for nausea, vomiting, abdominal pain.   Genitourinary: Negative for dysuria or flank pain.   Skin: Negative for itching or rash.   Neurological: Negative for sensory or motor changes.    Psychiatric/Behavioral: Positive for depressed mood and suicidal ideation with attempt by overdose.  See [facility] [place] for further details. All other systems are negative.     PAST MEDICAL HISTORY  Depression, hyperlipidemia, hypertension    PAST FAMILY HISTORY  History reviewed. No pertinent family history.    SOCIAL HISTORY  Social History     Tobacco Use   • Smoking status: Never Smoker   • Smokeless tobacco: Never Used   Substance and Sexual Activity   • Alcohol use: Not Currently   • Drug use: Not Currently   • Sexual  "activity: Not on file       SURGICAL HISTORY  patient denies any surgical history    CURRENT MEDICATIONS  Home Medications     Reviewed by Watson Pratt (Pharmacy Tech) on 09/11/21 at 2118  Med List Status: Complete   Medication Last Dose Status   ARIPiprazole (ABILIFY) 5 MG tablet 9/10/2021 Active   dicyclomine (BENTYL) 20 MG Tab 9/11/2021 Active   hydrOXYzine pamoate (VISTARIL) 25 MG Cap 9/11/2021 Active   meloxicam (MOBIC) 7.5 MG Tab 9/11/2021 Active   metoprolol SR (TOPROL XL) 50 MG TABLET SR 24 HR 9/11/2021 Active   mirtazapine (REMERON) 30 MG Tab tablet 9/10/2021 Active   sertraline (ZOLOFT) 100 MG Tab 9/11/2021 Active                ALLERGIES  No Known Allergies    PHYSICAL EXAM  VITAL SIGNS: /71   Pulse 95   Temp 37.4 °C (99.4 °F) (Temporal)   Resp 18   Ht 1.854 m (6' 1\")   Wt 84.8 kg (187 lb)   LMP 08/15/2021   SpO2 96%   BMI 24.67 kg/m²    Pulse ox interpretation: On room air, I interpret this pulse ox as normal.  Constitutional: Well-developed, well-nourished. Sitting up.   HEENT: Normocephalic, atraumatic. Posterior pharynx clear, mucous membranes moist.  Eyes:  EOMI. Normal sclerae.  Neck: Supple, nontender.  Chest/Pulmonary: Clear to ausculation bilaterally, no wheezes or rhonchi.  Cardiovascular: Regular rate and rhythm, no murmur.   Abdomen: Soft, nontender; no rebound, guarding, or masses.  Back: No CVA or midline tenderness.   Musculoskeletal: No deformity or edema.  Neuro: Clear speech, normal coordination, cranial nerves II-XII grossly intact, no focal asymmetry or sensory deficits.   Psych: Flat affect, sad mood, but cooperative.  Suicidal.  Skin: No rashes, warm and dry.      DIAGNOSTIC STUDIES / PROCEDURES    LABS & EKG  Results for orders placed or performed during the hospital encounter of 09/11/21   CBC WITH DIFFERENTIAL   Result Value Ref Range    WBC 10.1 4.8 - 10.8 K/uL    RBC 4.19 (L) 4.20 - 5.40 M/uL    Hemoglobin 12.5 12.0 - 16.0 g/dL    Hematocrit 36.9 (L) 37.0 - " 47.0 %    MCV 88.1 81.4 - 97.8 fL    MCH 29.8 27.0 - 33.0 pg    MCHC 33.9 33.6 - 35.0 g/dL    RDW 42.5 35.9 - 50.0 fL    Platelet Count 270 164 - 446 K/uL    MPV 9.7 9.0 - 12.9 fL    Neutrophils-Polys 75.20 (H) 44.00 - 72.00 %    Lymphocytes 16.30 (L) 22.00 - 41.00 %    Monocytes 6.90 0.00 - 13.40 %    Eosinophils 0.90 0.00 - 6.90 %    Basophils 0.30 0.00 - 1.80 %    Immature Granulocytes 0.40 0.00 - 0.90 %    Nucleated RBC 0.00 /100 WBC    Neutrophils (Absolute) 7.63 (H) 2.00 - 7.15 K/uL    Lymphs (Absolute) 1.65 1.00 - 4.80 K/uL    Monos (Absolute) 0.70 0.00 - 0.85 K/uL    Eos (Absolute) 0.09 0.00 - 0.51 K/uL    Baso (Absolute) 0.03 0.00 - 0.12 K/uL    Immature Granulocytes (abs) 0.04 0.00 - 0.11 K/uL    NRBC (Absolute) 0.00 K/uL   COMP METABOLIC PANEL   Result Value Ref Range    Sodium 140 135 - 145 mmol/L    Potassium 4.4 3.6 - 5.5 mmol/L    Chloride 111 96 - 112 mmol/L    Co2 18 (L) 20 - 33 mmol/L    Anion Gap 11.0 7.0 - 16.0    Glucose 105 (H) 65 - 99 mg/dL    Bun 15 8 - 22 mg/dL    Creatinine 0.57 0.50 - 1.40 mg/dL    Calcium 9.2 8.5 - 10.5 mg/dL    AST(SGOT) 15 12 - 45 U/L    ALT(SGPT) 7 2 - 50 U/L    Alkaline Phosphatase 87 30 - 99 U/L    Total Bilirubin <0.2 0.1 - 1.5 mg/dL    Albumin 4.3 3.2 - 4.9 g/dL    Total Protein 7.4 6.0 - 8.2 g/dL    Globulin 3.1 1.9 - 3.5 g/dL    A-G Ratio 1.4 g/dL   URINE DRUG SCREEN   Result Value Ref Range    Amphetamines Urine Negative Negative    Barbiturates Negative Negative    Benzodiazepines Negative Negative    Cocaine Metabolite Negative Negative    Methadone Negative Negative    Opiates Negative Negative    Oxycodone Negative Negative    Phencyclidine -Pcp Negative Negative    Propoxyphene Negative Negative    Cannabinoid Metab Negative Negative   ACETAMINOPHEN   Result Value Ref Range    Acetaminophen -Tylenol <5 (L) 10 - 30 ug/mL   Salicylate   Result Value Ref Range    Salicylates, Quant. <1 (L) 15 - 25 mg/dL   DIAGNOSTIC ALCOHOL   Result Value Ref Range     Diagnostic Alcohol <10.1 0.0 - 10.0 mg/dL   ESTIMATED GFR   Result Value Ref Range    GFR If African American >60 >60 mL/min/1.73 m 2    GFR If Non African American >60 >60 mL/min/1.73 m 2   EKG (NOW)   Result Value Ref Range    Report       [facility] Emergency Dept.    Test Date:  2021  Pt Name:    ROBERTA VIGIL              Department: ER  MRN:        [id number]                      Room:       [facility]  Gender:     Female                       Technician: [id number]  :        1995                   Requested By:ALEXANDRO TORRES  Order #:    [id number]                    Reading MD: Alexandro Torres MD    Measurements  Intervals                                Axis  Rate:       98                           P:          64  NY:         136                          QRS:        14  QRSD:       90                           T:          14  QT:         344  QTc:        440    Interpretive Statements  SINUS RHYTHM  No previous ECG available for comparison  No STEMI  Electronically Signed On 2021 21:53:39 PDT by Alexandro Torres MD         COURSE & MEDICAL DECISION MAKING    This is a 25 y.o. female who presents with suicide attempt, depression, overdose by mirtazapine earlier today.    Differential Diagnosis includes but is not limited to:  Overdose, ingestion, intoxication, psychiatric illness    ED Course:  25-year-old female with above complaint.  Plan ingestion rule out, observation, and probable transfer to [facility] hospital.  If medically cleared merits inpatient psychiatric treatment and she does seem to be in an acute mental health crisis    Thankfully, medical work-up today reassuring no signs of serious ingestion or intoxication.  However the patient does appear to be at an acute mental health crisis and is a threat to herself.  She was placed on an involuntary mental health hold will be transferred to psychiatric facility for higher level of acute mental health care.  Medically  cleared.    Medications - No data to display    FINAL IMPRESSION  1. Suicide attempt (HCC)    2. Depressed mood    3. Intentional drug overdose, initial encounter (HCC)        -Transfer to psychiatric facility for higher level of acute mental health care-       Results, exam findings, clinical impression, presumed diagnosis, treatment options, and strict return precautions were discussed with the patient, and they verbalized understanding, agreed with, and appreciated the plan of care.    Pertinent Labs & EKG studies reviewed and verified by myself, as well as nursing notes and the patient's past medical, family, and social histories (See chart for details).    Portions of this record were made with voice recognition software.  Despite my review, spelling/grammar/context errors may still remain.  Interpretation of this chart should be taken in this context.    Electronically signed by Alexandro Romero M.D. on 9/11/2021 at 9:54 PM.

## 2021-09-12 VITALS
OXYGEN SATURATION: 96 % | TEMPERATURE: 98.8 F | HEART RATE: 87 BPM | HEIGHT: 72 IN | BODY MASS INDEX: 25.33 KG/M2 | RESPIRATION RATE: 15 BRPM | SYSTOLIC BLOOD PRESSURE: 105 MMHG | WEIGHT: 187 LBS | DIASTOLIC BLOOD PRESSURE: 70 MMHG

## 2021-09-12 PROCEDURE — 99283 EMERGENCY DEPT VISIT LOW MDM: CPT | Mod: 95 | Performed by: PSYCHIATRY & NEUROLOGY

## 2021-09-12 NOTE — ED NOTES
Med rec updated and complete. Allergies reviewed.  Per [facility] call to [facility].  Pt was unable to recall  The names of the medications. Pt confirmed last doses taken.        Home pharmacy [facility]  [phone]        • dicyclomine (BENTYL) 20 MG Tab Take 20 mg by mouth every 6 hours.     • mirtazapine (REMERON) 30 MG Tab tablet Take 30 mg by mouth every evening.     • hydrOXYzine pamoate (VISTARIL) 25 MG Cap Take 25 mg by mouth 3 times a day as needed for Anxiety.     • metoprolol SR (TOPROL XL) 50 MG TABLET SR 24 HR Take 50 mg by mouth 2 times a day.     • sertraline (ZOLOFT) 100 MG Tab Take 100 mg by mouth every day.     • ARIPiprazole (ABILIFY) 5 MG tablet Take 5 mg by mouth every evening.

## 2021-09-12 NOTE — ED NOTES
Madison bonner/ Alert team @ [facility] w/ Dr. Montoya on Ipad. Evaluating pts POC. [facility] now here for transport to [facility]. That transfer is now on hold r/t evaluating a better POC for the pt with long term solutions.

## 2021-09-12 NOTE — ED NOTES
Pt states understanding of dc instructions and f/u care. Pt amb out w/ steady gait. Cab called for pt.

## 2021-09-12 NOTE — DISCHARGE PLANNING
Medical Social Work    Referral: Legal Hold    Intervention: Legal Hold Paperwork given to SW by [employer] RN Mable    Legal Hold Initiated: Date: 9/11/21 Time: 1827    Patient’s Insurance Listed on Face Sheet: [employer]    Referrals sent to: [facility]    This referral contains the following information:  1) Face sheet _x___  2) Page 1 and Page 2 of Legal Hold _x___  3) Alert Team Assessment/Psych Assessment __x__  4) Head to toe physical exam __x__  5) Urine Drug Screen __x__  6) Blood Alcohol __x__  7) Vital signs __x__  8) Pregnancy test when applicable ___  9) Medications list __x__    Plan: Patient will transfer to mental health facility once acceptance is obtained

## 2021-09-12 NOTE — CONSULTS
"[facility]   TRIAGE ASSESSMENT    Name: Mariel Carty  MRN: [id number]  : 1995  Age: 25 y.o.  Date of assessment: 2021  PCP: BLANCHE Hogue  Persons in attendance: Patient  Patient Location: [facility]    CHIEF COMPLAINT/PRESENTING ISSUE (as stated by patient): 25 year old female BIB EMS today, legal hold, SI, SA, ingested approx 7 RX Mirtazapine tablets, unknown dose, earlier today; pt medically cleared by [facility];  pt presents with possible developmental delay but writer RN unable to verify h/o diagnosis; pt alert, oriented x 4; calm; cooperative; pleasant; with organized thoughts and behaviors; no delusions, paranoia, hallucinations noted; insight, judgment adequate; currently denies SI, HI,  but states she feels \"unsafe\" to herself r/t current living situation with a new housemate; future-oriented; pt states she resides at [facility] since 2021 and recently a new house member came to the residence who \"called her names\" and is \"arguing\" with pt; pt states she was \"feeling overwhelmed, panicky, today, took seven tabs of Mirtazapine, started feeling sick, told the staff\" who called 911; states h/o 1 previous SA 2021, ingested RX pills, did not receive medical or MH tx; states she enjoys residing at the shelter and wants to return there; current outpt MH providers include [facility] therapist, Sherman, with weekly appts and next appt next week, , Sarah, and psychiatrist, Dr. Jaime Manning, at [facility] with monthly appts, who prescribes her psych meds but she does not remember what her meds are; also has a care coordinator with the [employer] (Outreach, Access, and Recovery) program, Cyn, with weekly check-ins; pt denies substance use; unemployed; previously received SSDI payments until age 18, then discontinued, and pt has re-applied for SS disability with assistance from [facility] " staff    Writer RN spoke to [facility] staff, Kavita, who states pt may return to this residence    [facility]  [phone]  [address] [place] [address] [address]  [place] [address]      Chief Complaint   Patient presents with   • Suicidal Ideation   • Medical Clearance        CURRENT LIVING SITUATION/SOCIAL SUPPORT/FINANCIAL RESOURCES: pt states she resides at [facility] since 1/2021;  unemployed; previously received SSDI payments until age 18, then discontinued, and pt has re-applied for SS disability with assistance from [facility] staff;   current outpt MH providers include [facility] therapistSherman, with weekly appts and next appt next week, Sarah, and psychiatrist, Dr. Jaime Manning, at [facility] with monthly appts; also has a care coordinator with the [employer] (Outreach, Access, and Recovery) programCyn      BEHAVIORAL HEALTH/SUBSTANCE USE TREATMENT HISTORY  Does patient/parent report a history of prior behavioral health/substance use treatment for patient?   Yes:    Dates Level of Care Facilty/Provider Diagnosis/Problem Medications   currently outpt  [facility] therapist, Sherman, with weekly appts and next appt next week, , Sarah     currently outpt  psychiatrist, Dr. Jaime Manning, at [facility] with monthly appts     currently Community resource care coordinator with the [employer] (Outreach, Access, and Recovery) programCyn, with weekly check-ins     1/2021 inpt MH [facility] SI        SAFETY ASSESSMENT - SELF  Does patient acknowledge current or past symptoms of dangerousness to self or is previous history noted? Yes- SI, SA, ingested approx 7 RX Mirtazapine tablets, unknown dose, earlier today;   states h/o 1 previous SA 6/2021, ingested RX pills, did not receive medical or MH tx  Does parent/significant other report patient has current or past symptoms of  dangerousness to self? N\A  Does presenting problem suggest symptoms of dangerousness to self? Yes:     Past Current    Suicidal Thoughts: [x]  [x]    Suicidal Plans: [x]  []    Suicidal Intent: []  []    Suicide Attempts: [x]  [x]    Self-Injury []  []      For any boxes checked above, provide detail: SI, SA, ingested approx 7 RX Mirtazapine tablets, unknown dose, earlier today;   states h/o 1 previous SA 6/2021, ingested RX pills, did not receive medical or MH tx      History of suicide by family member: no  History of suicide by friend/significant other: no  Recent change in frequency/specificity/intensity of suicidal thoughts or self-harm behavior? yes - earlier today  Current access to firearms, medications, or other identified means of suicide/self-harm? yes - RX medixations  If yes, willing to restrict access to means of suicide/self-harm? yes - no guns or weapons; keep medications locked and secure  Protective factors present:  Future-oriented, Hopefulness, Optimism, Positive coping skills, Positive self-efficacy, Actively engaged in treatment and Willing to address in treatment    SAFETY ASSESSMENT - OTHERS  Does patient acknowledge current or past symptoms of aggressive behavior or risk to others or is previous history noted? no  Does parent/significant other report patient has current or past symptoms of aggressive behavior or risk to others?  N\A  Does presenting problem suggest symptoms of dangerousness to others? No    LEGAL HISTORY  Does patient acknowledge history of arrest/assisted/halfway or is previous history noted? no    Crisis Safety Plan completed and copy given to patient? yes    ABUSE/NEGLECT SCREENING  Does patient report feeling “unsafe” in his/her home, or afraid of anyone?  no  Does patient report any history of physical, sexual, or emotional abuse?  no  Does parent or significant other report any of the above? N\A  Is there evidence of neglect by self?  no  Is there evidence of neglect by a  caregiver? no  Does the patient/parent report any history of CPS/APS/police involvement related to suspected abuse/neglect or domestic violence? no  Based on the information provided during the current assessment, is a mandated report of suspected abuse/neglect being made?  No    SUBSTANCE USE SCREENING  Pt denies substance use    ETOH negative  UDS negative      MENTAL STATUS   Participation: Active verbal participation, Attentive, Engaged and Open to feedback  Grooming: Casual and Neat  Orientation: Alert and Fully Oriented  Behavior: Calm  Eye contact: Good  Mood: Anxious  Affect: Flat and Anxious  Thought process: Logical, Goal-directed and Circumstantial  Thought content: Within normal limits  Speech: Rate within normal limits and Volume within normal limits  Perception: Within normal limits  Memory:  No gross evidence of memory deficits  Insight: Adequate  Judgment:  Adequate  Other:    Collateral information:   Source:  [] Significant other present in person:   [] Significant other by telephone  [] [facility]   [x] [facility] Staff  [x] [facility]  [] Other:     [] Unable to complete full assessment due to:  [] Acute intoxication  [] Patient declined to participate/engage  [] Patient verbally unresponsive  [] Significant cognitive deficits  [] Significant perceptual distortions or behavioral disorganization  [] Other:      CLINICAL IMPRESSIONS:  Primary: emotion dysregulation  Secondary:  R/o developmental delay       IDENTIFIED NEEDS/PLAN:  [Trigger DISPOSITION list for any items marked]    [x]  Imminent safety risk - self [] Imminent safety risk - others   []  Acute substance withdrawal []  Psychosis/Impaired reality testing   [x]  Mood/anxiety []  Substance use/Addictive behavior   [x]  Maladaptive behaviro []  Parent/child conflict   []  Family/Couples conflict []  Biomedical   []  Housing []  Financial   []   Legal  Occupational/Educational   []  Domestic violence []  Other:      Recommended Plan of Care:  Actively being addressed by Legal [facility]; [place] Medicaid insurance plan; [facility]; Continue pt level of observation per the Gravity Suicide Severity Rating Scale (C-SSRS) assessment completed by [facility] ED RN every shift, currently at moderate risk;  1:1 observation  *Telesitter may not be utilized for moderate or high risk patients    For future resources, writer RN reviewed [facility] providers with pt, with written information given, including [facility], [facility], [facility]; also reviewed [place] transportation included in pt's insurance plan; jigar RN to fax pt referral to [facility]; pt verbalized understanding      Has the Recommended Plan of Care/Level of Observation been reviewed with the patient's assigned nurse? yes    Does patient/parent or guardian express agreement with the above plan? yes    Referral appointment(s) scheduled? no    Alert team only:   I have discussed findings and recommendations with Dr. Cintron who is in agreement with these recommendations. Legal hold completed    Referral information sent to the following community providers : NA    If applicable : Referred  to : 9/11/21 for legal hold follow up at (time): 1900      Ciara Horowitz R.N.  9/11/2021

## 2021-09-12 NOTE — ED PROVIDER NOTES
ED Provider Note    ED Provider Note Addendum     This is an addendum to the note on Mariel Carty.  For further details and full chart information, see patient's initial note.          7:30 AM    - Patient evaluated by myself.  Patient is resting comfortably at this time with no complaints. Patient is awaiting transfer to psychiatric facility for further psych evaluation.    Disposition:  Patient will be transferred to an appropriate psychiatric facility in stable condition for further psychiatric care and evaluation.  Patient will be placed under the care of my partner awaiting transfer.    FINAL IMPRESSION  1. Suicide attempt (HCC)    2. Depressed mood    3. Intentional drug overdose, initial encounter (HCC)            The note accurately reflects work and decisions made by me.  Adama Aguero M.D.  9/12/2021  7:30 AM

## 2021-09-12 NOTE — ED NOTES
"1           [facility] Medicine Services  INPATIENT PROGRESS NOTE    Patient Name: Mini Gentile  Date of Admission: 6/30/2021  Today's Date: 07/05/21  Length of Stay: 5  Primary Care Physician: Bharati Dahl APRN    Subjective   Chief Complaint: Follow-up  HPI   She is a 63-year-old woman with end-stage renal disease.  She has been in and out of the hospital.  In June 2021 alone she had 3 hospitalizations.  The last time she was hospitalized dates back to June 16 where she was found with Pseudomonas bacteremia and C. difficile associated diarrhea.  She was seen in consult at that time by infectious disease.  She completed a course of treatment for bacteremia.  She was continued on vancomycin upon discharge.  She was sent to skilled nursing facility.  She presented back on June 13 with fever and altered mental status.  She is suspected to have encephalopathy from underlying needs for dialysis and infection.  Etiology is unclear.  Reportedly has been having diarrhea at facility but no sample as of yet since hospitalized.  Patient reportedly has right pleural effusion but generally has volume overload from dialysis needs.  She has no symptoms of shortness of breath or cough.  Patient reportedly anuric/oliguric and been on maintenance dialysis.    During this admission she has been seen and evaluated by infectious disease and nephrology service.  The plan was for a 2-week course of vancomycin.  She is cleared by infectious disease for discharge.  Patient is awaiting pre-CERT to go back to skilled nursing facility.  Suggested antibiotic regimen will be vancomycin 125 mg 4 times a day from initiation.  ID signed off on July 3.    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    \"I just could not stay out of the hospital.\"  \"I could not sleep.\"  States she has not had any bowel movement today  Review of " Patient resting in bed with eyes closed, unlabored respirations. Safety sitter in direct view of patient. Will continue to monitor.    Systems   Limited information from patient at this point.  Patient states that she is so sleepy.  It does not appear that she has insight of event that led her to come to the hospital this time.  Patient is very vague.      Objective    Temp:  [96.5 °F (35.8 °C)-98.6 °F (37 °C)] 96.5 °F (35.8 °C)  Heart Rate:  [70-75] 70  Resp:  [18] 18  BP: (140-148)/(66-72) 144/68  Physical Exam   Seated comfortably on the recliner.  No distress  States that she is sleepy and was not able to sleep well  GEN:  interactive but limited, in NAD  HEENT: Anicteric, Trachea midline  Lungs: CTAB, no wheezing/rales/rhonchi  Heart: RRR, +S1/s2, no rub  ABD: soft, nt/nd, +BS, no guarding/rebound  Extremities: atraumatic, no cyanosis; positive edema of ankles  Skin: no rashes or lesions  Neuro: AAOx3, no focal deficits  Psych: normal mood & affect       Results Review:  I have reviewed the labs, radiology results, and diagnostic studies.    Laboratory Data:   Results from last 7 days   Lab Units 07/03/21 0459 07/02/21 0440 07/01/21  0242   WBC 10*3/mm3 4.80 6.40 10.66   HEMOGLOBIN g/dL 10.8* 10.0* 10.1*   HEMATOCRIT % 36.0 33.7* 33.7*   PLATELETS 10*3/mm3 198 188 199        Results from last 7 days   Lab Units 07/03/21 0459 07/02/21 0440 07/01/21  0242 06/30/21  1124   SODIUM mmol/L 132* 136 135* 137   POTASSIUM mmol/L 4.8 4.4 4.8 4.2   CHLORIDE mmol/L 91* 96* 94* 94*   CO2 mmol/L 28.0 30.0* 28.0 29.0   BUN mg/dL 30* 23 31* 26*   CREATININE mg/dL 3.93* 3.24* 4.33* 3.75*   CALCIUM mg/dL 9.1 8.8 8.7 9.0   BILIRUBIN mg/dL  --   --   --  0.9   ALK PHOS U/L  --   --   --  314*   ALT (SGPT) U/L  --   --   --  6   AST (SGOT) U/L  --   --   --  15   GLUCOSE mg/dL 122* 139* 82 210*       Culture Data:   Blood Culture   Date Value Ref Range Status   06/30/2021 No growth at 4 days  Preliminary   06/30/2021 No growth at 4 days  Preliminary       Radiology Data:   Imaging Results (Last 24 Hours)     ** No results found for the last 24 hours. **               I have reviewed the patient's current medications.     Assessment/Plan     Active Hospital Problems    Diagnosis    • **Altered mental status    • Fever    • Elevated troponin    • Fx humeral neck, right, with nonunion, subsequent encounter    • Pleural effusion on right    • C. difficile diarrhea    • Chronic anemia    • DM2 (diabetes mellitus, type 2) (CMS/[facility])    • HTN (hypertension)    Mild hyponatremia likely from end-stage renal disease      Awaiting pre-CERT to go back to skilled nursing facility  Continue vancomycin p.o. 125 mg 4 times a day to complete 14-day course  Maintenance dialysis per schedule  Stable blood pressure and hemoglobin    aspirin, 81 mg, Oral, Daily  atropine, 1 drop, Left Eye, Daily  brimonidine, 1 drop, Left Eye, BID  carvedilol, 6.25 mg, Oral, BID With Meals  famotidine, 20 mg, Oral, Daily  heparin (porcine), 5,000 Units, Subcutaneous, Q12H  insulin regular, 2-7 Units, Subcutaneous, TID With Meals  nicotine, 1 patch, Transdermal, Q24H  pravastatin, 10 mg, Oral, Nightly  saccharomyces boulardii, 500 mg, Oral, BID  sertraline, 25 mg, Oral, Daily  sodium chloride, 10 mL, Intravenous, Q12H  timolol, 1 drop, Left Eye, QAM  vancomycin, 125 mg, Oral, Q6H          Discharge Planning:  ?    Electronically signed by Sundeep Aldridge MD, 07/05/21, 08:04 CDT.

## 2021-09-12 NOTE — DISCHARGE PLANNING
PETRA provided cab voucher to the [facility]. Voucher#[id number].    Legal hold d/c'd by psych. [facility] transport cancelled.

## 2021-09-12 NOTE — ED NOTES
Gave report to Bridget at [facility]. Patient to be transported to [facility] at 02:00am.

## 2021-09-12 NOTE — ED NOTES
Patient belongings placed in locker #5. Discussed process with patient. Patient verbalized understanding of teaching.

## 2021-09-12 NOTE — ED NOTES
Patient resting in bed with eyes closed, unlabored respirations. Safety sitter in direct view of patient. Will continue to monitor.
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
## 2021-09-12 NOTE — DISCHARGE PLANNING
Medical Social Work    Referral: Legal hold Transfer to Mental Health Facility    Intervention: SW received call from [facility] stating that they have accepted the patient for admission.     Pt's accepting physcian is Dr. Dixon.     KAILYN arranged for transportation to be set up through [facility]    The pt will be picked up at 0930.     KAILYN notified the RN of the departure time as well as accepting facility.     KAILYN created transfer packet and placed on chart.     Plan: Pt will transfer back to [facility] at 0930.

## 2021-09-13 LAB — HCG SERPL QL: NEGATIVE

## 2021-09-13 NOTE — DISCHARGE PLANNING
RITOW received a call from Junior bonner/ the [facility] [[email or web address]] team who confirms pt does have a bed at [facility] and her clinician is Veronica Mcclelland.

## 2021-11-15 ENCOUNTER — OFFICE VISIT (OUTPATIENT)
Dept: URGENT CARE | Facility: CLINIC | Age: 26
End: 2021-11-15
Payer: MEDICAID

## 2021-11-15 VITALS
SYSTOLIC BLOOD PRESSURE: 102 MMHG | HEART RATE: 87 BPM | BODY MASS INDEX: 29.31 KG/M2 | WEIGHT: 216.4 LBS | TEMPERATURE: 98.6 F | RESPIRATION RATE: 20 BRPM | HEIGHT: 72 IN | DIASTOLIC BLOOD PRESSURE: 72 MMHG | OXYGEN SATURATION: 97 %

## 2021-11-15 DIAGNOSIS — H61.21 IMPACTED CERUMEN OF RIGHT EAR: ICD-10-CM

## 2021-11-15 DIAGNOSIS — H91.91 DECREASED HEARING OF RIGHT EAR: ICD-10-CM

## 2021-11-15 PROCEDURE — 99203 OFFICE O/P NEW LOW 30 MIN: CPT | Performed by: NURSE PRACTITIONER

## 2021-11-15 RX ORDER — OFLOXACIN 3 MG/ML
5 SOLUTION AURICULAR (OTIC) DAILY
Qty: 10 ML | Refills: 0 | Status: SHIPPED | OUTPATIENT
Start: 2021-11-15 | End: 2021-11-22

## 2021-11-15 ASSESSMENT — FIBROSIS 4 INDEX: FIB4 SCORE: 0.55

## 2021-11-16 NOTE — PROGRESS NOTES
Subjective     Mariel Carty is a 26 y.o. female who presents with Hearing Problem (The patient said she's been having trouble hearing for years.  It may have been caused by a fire cracker, but she's not sure.  She said that even when she wear earrings the ear gets red and swollen.  She's had ear lavages, etc. so she doesn't believe it's wax build up.)    No past medical history on file.  Social History     Socioeconomic History   • Marital status: Single     Spouse name: Not on file   • Number of children: Not on file   • Years of education: Not on file   • Highest education level: Not on file   Occupational History   • Not on file   Tobacco Use   • Smoking status: Never Smoker   • Smokeless tobacco: Never Used   Vaping Use   • Vaping Use: Never used   Substance and Sexual Activity   • Alcohol use: Not Currently   • Drug use: Not Currently   • Sexual activity: Not Currently   Other Topics Concern   • Not on file   Social History Narrative   • Not on file     Social Determinants of Health     Financial Resource Strain:    • Difficulty of Paying Living Expenses: Not on file   Food Insecurity:    • Worried About Running Out of Food in the Last Year: Not on file   • Ran Out of Food in the Last Year: Not on file   Transportation Needs:    • Lack of Transportation (Medical): Not on file   • Lack of Transportation (Non-Medical): Not on file   Physical Activity:    • Days of Exercise per Week: Not on file   • Minutes of Exercise per Session: Not on file   Stress:    • Feeling of Stress : Not on file   Social Connections:    • Frequency of Communication with Friends and Family: Not on file   • Frequency of Social Gatherings with Friends and Family: Not on file   • Attends Jehovah's witness Services: Not on file   • Active Member of Clubs or Organizations: Not on file   • Attends Club or Organization Meetings: Not on file   • Marital Status: Not on file   Intimate Partner Violence:    • Fear of Current or Ex-Partner: Not on  "file   • Emotionally Abused: Not on file   • Physically Abused: Not on file   • Sexually Abused: Not on file   Housing Stability:    • Unable to Pay for Housing in the Last Year: Not on file   • Number of Places Lived in the Last Year: Not on file   • Unstable Housing in the Last Year: Not on file     No family history on file.    Allergies: Patient has no known allergies.    Patient is a 26-year-old female who presents today with complaint of decreased hearing to the right ear.  Symptoms started chronically for several years.        Other  This is a chronic problem. The current episode started more than 1 year ago. The problem occurs constantly. The problem has been unchanged. Nothing aggravates the symptoms. She has tried nothing for the symptoms. The treatment provided no relief.       Review of Systems   HENT: Positive for hearing loss.    All other systems reviewed and are negative.             Objective     /72 (BP Location: Right arm, Patient Position: Sitting, BP Cuff Size: Adult)   Pulse 87   Temp 37 °C (98.6 °F) (Temporal)   Resp 20   Ht 1.854 m (6' 1\")   Wt 98.2 kg (216 lb 6.4 oz)   SpO2 97%   BMI 28.55 kg/m²      Physical Exam  Vitals reviewed.   Constitutional:       Appearance: Normal appearance.   HENT:      Right Ear: Tympanic membrane, ear canal and external ear normal.      Left Ear: Tympanic membrane, ear canal and external ear normal.      Nose: Nose normal.      Mouth/Throat:      Mouth: Mucous membranes are moist.   Eyes:      Extraocular Movements: Extraocular movements intact.      Conjunctiva/sclera: Conjunctivae normal.      Pupils: Pupils are equal, round, and reactive to light.   Neurological:      Mental Status: She is alert.          Cerumen impaction noted to the right ear.    Post lavage: Moderate amount of cerumen expectorated.  Patient reports minimal relief.  EAC is moderately red after lavage.                   Assessment & Plan   Chronic hearing loss  Cerumen " impaction    Referral given to ENT  Floxin otic drops  Follow up in UC for any further questions or concerns      There are no diagnoses linked to this encounter.

## 2023-04-06 ENCOUNTER — APPOINTMENT (OUTPATIENT)
Dept: RADIOLOGY | Facility: MEDICAL CENTER | Age: 28
End: 2023-04-06
Attending: EMERGENCY MEDICINE
Payer: MEDICAID

## 2023-04-06 ENCOUNTER — HOSPITAL ENCOUNTER (EMERGENCY)
Facility: MEDICAL CENTER | Age: 28
End: 2023-04-06
Attending: EMERGENCY MEDICINE
Payer: MEDICAID

## 2023-04-06 VITALS
OXYGEN SATURATION: 100 % | HEIGHT: 72 IN | BODY MASS INDEX: 34.22 KG/M2 | HEART RATE: 65 BPM | SYSTOLIC BLOOD PRESSURE: 113 MMHG | RESPIRATION RATE: 20 BRPM | WEIGHT: 252.65 LBS | DIASTOLIC BLOOD PRESSURE: 70 MMHG | TEMPERATURE: 98.7 F

## 2023-04-06 DIAGNOSIS — R07.89 ATYPICAL CHEST PAIN: ICD-10-CM

## 2023-04-06 LAB — EKG IMPRESSION: NORMAL

## 2023-04-06 PROCEDURE — 71045 X-RAY EXAM CHEST 1 VIEW: CPT

## 2023-04-06 PROCEDURE — 93005 ELECTROCARDIOGRAM TRACING: CPT

## 2023-04-06 PROCEDURE — 93005 ELECTROCARDIOGRAM TRACING: CPT | Performed by: EMERGENCY MEDICINE

## 2023-04-06 PROCEDURE — 99283 EMERGENCY DEPT VISIT LOW MDM: CPT

## 2023-04-06 ASSESSMENT — FIBROSIS 4 INDEX: FIB4 SCORE: 0.48

## 2023-04-07 NOTE — ED PROVIDER NOTES
"ED Provider Note    Primary care provider: BLANCHE Hogue    CHIEF COMPLAINT  Chief Complaint   Patient presents with    Chest Pain   HPI  Mariel Carty is a 27 y.o. female who presents to the Emergency Department for chest pain.  The patient gets stabbing chest pain intermittently for the past 2 or 3 weeks, she notes that every few days, last less than 20 minutes at a time without any associated symptoms.  Does not appear to be precipitated by eating, drinking, deep breaths or or exertion.  She does feel that she is under a lot of stress of late, she has been living at the [facility] since October.  She does feel sad at times but not suicidal.  She has been compliant with all her medications.    She denies any history of DVT or pulmonary embolus, she does not take exogenous estrogen, no recent surgeries.      External Record Review: Last ER visit was in 2022, she was seen at an outside hospital for SI.  Went to the [facility] today for chest pain but no note in the system as of yet.    REVIEW OF SYSTEMS  See HPI.     PAST MEDICAL HISTORY       SURGICAL HISTORY  patient denies any surgical history    SOCIAL HISTORY  Social History     Tobacco Use    Smoking status: Never    Smokeless tobacco: Never   Vaping Use    Vaping Use: Never used   Substance Use Topics    Alcohol use: Never    Drug use: Never      Social History     Substance and Sexual Activity   Drug Use Never       FAMILY HISTORY  History reviewed. No pertinent family history.    CURRENT MEDICATIONS  Reviewed.  See Encounter Summary.     ALLERGIES  No Known Allergies    PHYSICAL EXAM  VITAL SIGNS: /70   Pulse 65   Temp 37.1 °C (98.7 °F)   Resp 20   Ht 1.854 m (6' 1\")   Wt 115 kg (252 lb 10.4 oz)   SpO2 100%   BMI 33.33 kg/m²   Constitutional: Awake, alert in no apparent distress.  HENT: Normocephalic, Bilateral external ears normal. Nose normal.   Eyes: Conjunctiva normal, non-icteric, EOMI.    Thorax & " Lungs: Easy unlabored respirations, Clear to ascultation bilaterally.  Cardiovascular: Regular rate, Regular rhythm, No murmurs, rubs or gallops. Bilateral pulses symmetrical.   Abdomen:  Soft, nontender, nondistended, normal active bowel sounds.   :    Skin: Visualized skin is  Dry, No erythema, No rash.   Musculoskeletal:   No cyanosis, clubbing or edema. No leg asymmetry.   Neurologic: Alert, Grossly non-focal.   Psychiatric: Normal affect, Normal mood  Lymphatic:      EKG   12 lead Interpreted by me  Rhythm:  Normal sinus rhythm   Rate: 68  Axis: normal  Ectopy: none  Conduction: normal  ST Segments: no acute change  T Waves: no acute change  Clinical Impression: Normal EKG without acute changes     RADIOLOGY  DX-CHEST-PORTABLE (1 VIEW)   Final Result      Negative single view of the chest.            COURSE & MEDICAL DECISION MAKING  Pertinent Labs & Imaging studies reviewed. (See chart for details)    Differential diagnoses include but are not limited to: Very atypical in nature, no cardiac risk, PERC negative for pulmonary embolus, most likely anxiety.  Do not suspect ACS at this time.    6:00 PM - Nursing notes reviewed, patient seen and examined at bedside.  Escalation of care considered, and ultimately not performed: blood analysis.    Barriers to care at this time, including but not limited to: Patient is homeless.     Decision tools and prescription drugs considered including, but not limited to: HEART Score low. .  PERC negative.    Decision Making:  This is a pleasant 27 y.o. year old female who presents with chest pain.  The pain is very atypical in nature, lasting for few minutes, stabbing without any provoking or alleviating factors.  She is PERC negative for pulmonary embolism.  Do not suspect ACS, no cardiac risk factors, EKG unremarkable.  Chest x-ray also normal.  No evidence of pneumothorax.    Etiology not entirely clear today, she does have a list of psychiatric issues and admits to fair  amount of stress.  She also does seem somewhat lonely which may be also playing a role in today's presentation.  Reassurance provided.  Recommend follow-up with primary care for additional management.        Discharge Medications:  Discharge Medication List as of 4/6/2023  6:32 PM          The patient was discharged home (see d/c instructions) was told to return immediately for any signs or symptoms listed, or any worsening at all.  The patient verbally agreed to the discharge precautions and follow-up plan which is documented in EPIC.        FINAL IMPRESSION  1. Atypical chest pain                Oriented - self; Oriented - place; Oriented - time

## 2023-04-07 NOTE — ED TRIAGE NOTES
Vitals:    04/06/23 1633   BP: 122/80   Pulse: 77   Resp: 18   Temp: 37.1 °C (98.7 °F)   SpO2: 97%     Chief Complaint   Patient presents with    Chest Pain     Pt reports intermittent chest pain for the last several weeks. She doesn't notice if anything makes it better or worse.     Pt is ambulatory to and from triage and is alert and oriented x 4.

## 2023-12-31 ENCOUNTER — APPOINTMENT (OUTPATIENT)
Dept: RADIOLOGY | Facility: MEDICAL CENTER | Age: 28
End: 2023-12-31
Attending: EMERGENCY MEDICINE
Payer: MEDICAID

## 2023-12-31 ENCOUNTER — HOSPITAL ENCOUNTER (EMERGENCY)
Facility: MEDICAL CENTER | Age: 28
End: 2023-12-31
Attending: EMERGENCY MEDICINE
Payer: MEDICAID

## 2023-12-31 VITALS
DIASTOLIC BLOOD PRESSURE: 68 MMHG | BODY MASS INDEX: 32.51 KG/M2 | RESPIRATION RATE: 16 BRPM | TEMPERATURE: 98 F | HEIGHT: 72 IN | OXYGEN SATURATION: 95 % | WEIGHT: 240 LBS | SYSTOLIC BLOOD PRESSURE: 116 MMHG | HEART RATE: 79 BPM

## 2023-12-31 DIAGNOSIS — R07.9 CHEST PAIN, UNSPECIFIED TYPE: ICD-10-CM

## 2023-12-31 LAB
ALBUMIN SERPL BCP-MCNC: 4 G/DL (ref 3.2–4.9)
ALBUMIN/GLOB SERPL: 1.5 G/DL
ALP SERPL-CCNC: 75 U/L (ref 30–99)
ALT SERPL-CCNC: 8 U/L (ref 2–50)
ANION GAP SERPL CALC-SCNC: 11 MMOL/L (ref 7–16)
AST SERPL-CCNC: 10 U/L (ref 12–45)
BASOPHILS # BLD AUTO: 0.4 % (ref 0–1.8)
BASOPHILS # BLD: 0.03 K/UL (ref 0–0.12)
BILIRUB SERPL-MCNC: <0.2 MG/DL (ref 0.1–1.5)
BUN SERPL-MCNC: 15 MG/DL (ref 8–22)
CALCIUM ALBUM COR SERPL-MCNC: 8.9 MG/DL (ref 8.5–10.5)
CALCIUM SERPL-MCNC: 8.9 MG/DL (ref 8.5–10.5)
CHLORIDE SERPL-SCNC: 104 MMOL/L (ref 96–112)
CO2 SERPL-SCNC: 23 MMOL/L (ref 20–33)
CREAT SERPL-MCNC: 0.63 MG/DL (ref 0.5–1.4)
EKG IMPRESSION: NORMAL
EOSINOPHIL # BLD AUTO: 0.1 K/UL (ref 0–0.51)
EOSINOPHIL NFR BLD: 1.3 % (ref 0–6.9)
ERYTHROCYTE [DISTWIDTH] IN BLOOD BY AUTOMATED COUNT: 41.1 FL (ref 35.9–50)
GFR SERPLBLD CREATININE-BSD FMLA CKD-EPI: 124 ML/MIN/1.73 M 2
GLOBULIN SER CALC-MCNC: 2.6 G/DL (ref 1.9–3.5)
GLUCOSE SERPL-MCNC: 117 MG/DL (ref 65–99)
HCG SERPL QL: NEGATIVE
HCT VFR BLD AUTO: 39 % (ref 37–47)
HGB BLD-MCNC: 13.2 G/DL (ref 12–16)
IMM GRANULOCYTES # BLD AUTO: 0.02 K/UL (ref 0–0.11)
IMM GRANULOCYTES NFR BLD AUTO: 0.3 % (ref 0–0.9)
LIPASE SERPL-CCNC: 47 U/L (ref 11–82)
LYMPHOCYTES # BLD AUTO: 2.15 K/UL (ref 1–4.8)
LYMPHOCYTES NFR BLD: 27.6 % (ref 22–41)
MCH RBC QN AUTO: 29.7 PG (ref 27–33)
MCHC RBC AUTO-ENTMCNC: 33.8 G/DL (ref 32.2–35.5)
MCV RBC AUTO: 87.6 FL (ref 81.4–97.8)
MONOCYTES # BLD AUTO: 0.48 K/UL (ref 0–0.85)
MONOCYTES NFR BLD AUTO: 6.2 % (ref 0–13.4)
NEUTROPHILS # BLD AUTO: 5.01 K/UL (ref 1.82–7.42)
NEUTROPHILS NFR BLD: 64.2 % (ref 44–72)
NRBC # BLD AUTO: 0 K/UL
NRBC BLD-RTO: 0 /100 WBC (ref 0–0.2)
PLATELET # BLD AUTO: 244 K/UL (ref 164–446)
PMV BLD AUTO: 9.3 FL (ref 9–12.9)
POTASSIUM SERPL-SCNC: 4.1 MMOL/L (ref 3.6–5.5)
PROT SERPL-MCNC: 6.6 G/DL (ref 6–8.2)
RBC # BLD AUTO: 4.45 M/UL (ref 4.2–5.4)
SODIUM SERPL-SCNC: 138 MMOL/L (ref 135–145)
TROPONIN T SERPL-MCNC: <6 NG/L (ref 6–19)
WBC # BLD AUTO: 7.8 K/UL (ref 4.8–10.8)

## 2023-12-31 PROCEDURE — 83690 ASSAY OF LIPASE: CPT

## 2023-12-31 PROCEDURE — 96374 THER/PROPH/DIAG INJ IV PUSH: CPT

## 2023-12-31 PROCEDURE — 85025 COMPLETE CBC W/AUTO DIFF WBC: CPT

## 2023-12-31 PROCEDURE — 84703 CHORIONIC GONADOTROPIN ASSAY: CPT

## 2023-12-31 PROCEDURE — 93005 ELECTROCARDIOGRAM TRACING: CPT | Performed by: EMERGENCY MEDICINE

## 2023-12-31 PROCEDURE — 700111 HCHG RX REV CODE 636 W/ 250 OVERRIDE (IP): Mod: UD | Performed by: EMERGENCY MEDICINE

## 2023-12-31 PROCEDURE — 71045 X-RAY EXAM CHEST 1 VIEW: CPT

## 2023-12-31 PROCEDURE — 93005 ELECTROCARDIOGRAM TRACING: CPT

## 2023-12-31 PROCEDURE — 36415 COLL VENOUS BLD VENIPUNCTURE: CPT

## 2023-12-31 PROCEDURE — 84484 ASSAY OF TROPONIN QUANT: CPT

## 2023-12-31 PROCEDURE — 80053 COMPREHEN METABOLIC PANEL: CPT

## 2023-12-31 PROCEDURE — 99284 EMERGENCY DEPT VISIT MOD MDM: CPT

## 2023-12-31 RX ORDER — KETOROLAC TROMETHAMINE 30 MG/ML
15 INJECTION, SOLUTION INTRAMUSCULAR; INTRAVENOUS ONCE
Status: COMPLETED | OUTPATIENT
Start: 2023-12-31 | End: 2023-12-31

## 2023-12-31 RX ADMIN — KETOROLAC TROMETHAMINE 15 MG: 30 INJECTION, SOLUTION INTRAMUSCULAR; INTRAVENOUS at 18:49

## 2023-12-31 ASSESSMENT — PAIN DESCRIPTION - PAIN TYPE: TYPE: ACUTE PAIN

## 2023-12-31 ASSESSMENT — FIBROSIS 4 INDEX: FIB4 SCORE: 0.5

## 2024-01-01 NOTE — ED TRIAGE NOTES
Chief Complaint   Patient presents with    Chest Pain     Started this morning, mid chest ,does not radiate    Shortness of Breath     Pt bib ems from [facility] c/o chest pain upon inspiration and palpation with sob. Pt speaking full sentences, nad.   EKG completed

## 2024-01-01 NOTE — ED PROVIDER NOTES
ED Provider Note    CHIEF COMPLAINT  Chief Complaint   Patient presents with    Chest Pain     Started this morning, mid chest ,does not radiate    Shortness of Breath       EXTERNAL RECORDS REVIEWED  Outpatient Notes [facility] in February 2022 for medical clearance after overdose    HPI/ROS  LIMITATION TO HISTORY   Select: : None  OUTSIDE HISTORIAN(S):  none    Mariel Carty is a 28 y.o. female who presents with chest pain.  Patient reports that her pain began this morning while she was at rest.  It is upper central, both sharp and achy.  It does seem to come and go, there is no radiation to her back arms or otherwise.  No ripping or tearing sensation.  She states no known alleviating or aggravating factors does not seem to change when she walks around or exertional, does not seem to change when she takes a deep breath or with any sort of position.  She reports some associated shortness of breath with the pain although now this is improved.  No recent illness fevers chills cough or congestion.  No abdominal pain, nausea or vomiting.  No leg pain or swelling.  No recent travel.  Does not take OCPs, does not smoke cigarettes.    PAST MEDICAL HISTORY       SURGICAL HISTORY  patient denies any surgical history    FAMILY HISTORY  No family history on file.    SOCIAL HISTORY  Social History     Tobacco Use    Smoking status: Never    Smokeless tobacco: Never   Vaping Use    Vaping Use: Never used   Substance and Sexual Activity    Alcohol use: Never    Drug use: Never    Sexual activity: Not Currently       CURRENT MEDICATIONS  Home Medications       Reviewed by Nicky Burks R.N. (Registered Nurse) on 12/31/23 at 1629  Med List Status: Not Addressed     Medication Last Dose Status   ARIPiprazole (ABILIFY) 5 MG tablet  Active   dicyclomine (BENTYL) 20 MG Tab  Active   hydrOXYzine pamoate (VISTARIL) 25 MG Cap  Active   meloxicam (MOBIC) 7.5 MG Tab  Active   metoprolol SR (TOPROL XL) 50 MG TABLET SR 24 HR   "Active   mirtazapine (REMERON) 30 MG Tab tablet  Active   sertraline (ZOLOFT) 100 MG Tab  Active                    ALLERGIES  No Known Allergies    PHYSICAL EXAM  VITAL SIGNS: /68   Pulse 79   Temp 36.4 °C (97.6 °F) (Temporal)   Resp 16   Ht 1.854 m (6' 1\")   Wt 109 kg (240 lb)   SpO2 95%   BMI 31.66 kg/m²      Pulse ox interpretation: I interpret this pulse ox as normal.  Constitutional: Alert in no apparent distress.  HENT: No signs of trauma, Bilateral external ears normal, Nose normal.   Eyes: Pupils are equal and reactive, Conjunctiva normal, Non-icteric.   Neck: Normal range of motion, No tenderness, Supple, No stridor.   Cardiovascular: Regular rate and rhythm, no murmurs.   Thorax & Lungs: Normal breath sounds, No respiratory distress, No wheezing, No chest tenderness.   Abdomen: Bowel sounds normal, Soft, No tenderness, No masses, No pulsatile masses. No peritoneal signs.  Skin: Warm, Dry, No erythema, No rash.   Back: No bony tenderness, No CVA tenderness.   Extremities: Intact distal pulses, No edema, No tenderness, No cyanosis,  Negative Elie's sign.   Musculoskeletal: Good range of motion in all major joints. No tenderness to palpation or major deformities noted.   Neurologic: Alert , Normal motor function, Normal sensory function, No focal deficits noted.   Psychiatric: Affect normal, Judgment normal, Mood normal.               DIAGNOSTIC STUDIES / PROCEDURES  Results for orders placed or performed during the hospital encounter of 12/31/23   CBC WITH DIFFERENTIAL   Result Value Ref Range    WBC 7.8 4.8 - 10.8 K/uL    RBC 4.45 4.20 - 5.40 M/uL    Hemoglobin 13.2 12.0 - 16.0 g/dL    Hematocrit 39.0 37.0 - 47.0 %    MCV 87.6 81.4 - 97.8 fL    MCH 29.7 27.0 - 33.0 pg    MCHC 33.8 32.2 - 35.5 g/dL    RDW 41.1 35.9 - 50.0 fL    Platelet Count 244 164 - 446 K/uL    MPV 9.3 9.0 - 12.9 fL    Neutrophils-Polys 64.20 44.00 - 72.00 %    Lymphocytes 27.60 22.00 - 41.00 %    Monocytes 6.20 0.00 - " 13.40 %    Eosinophils 1.30 0.00 - 6.90 %    Basophils 0.40 0.00 - 1.80 %    Immature Granulocytes 0.30 0.00 - 0.90 %    Nucleated RBC 0.00 0.00 - 0.20 /100 WBC    Neutrophils (Absolute) 5.01 1.82 - 7.42 K/uL    Lymphs (Absolute) 2.15 1.00 - 4.80 K/uL    Monos (Absolute) 0.48 0.00 - 0.85 K/uL    Eos (Absolute) 0.10 0.00 - 0.51 K/uL    Baso (Absolute) 0.03 0.00 - 0.12 K/uL    Immature Granulocytes (abs) 0.02 0.00 - 0.11 K/uL    NRBC (Absolute) 0.00 K/uL   COMP METABOLIC PANEL   Result Value Ref Range    Sodium 138 135 - 145 mmol/L    Potassium 4.1 3.6 - 5.5 mmol/L    Chloride 104 96 - 112 mmol/L    Co2 23 20 - 33 mmol/L    Anion Gap 11.0 7.0 - 16.0    Glucose 117 (H) 65 - 99 mg/dL    Bun 15 8 - 22 mg/dL    Creatinine 0.63 0.50 - 1.40 mg/dL    Calcium 8.9 8.5 - 10.5 mg/dL    Correct Calcium 8.9 8.5 - 10.5 mg/dL    AST(SGOT) 10 (L) 12 - 45 U/L    ALT(SGPT) 8 2 - 50 U/L    Alkaline Phosphatase 75 30 - 99 U/L    Total Bilirubin <0.2 0.1 - 1.5 mg/dL    Albumin 4.0 3.2 - 4.9 g/dL    Total Protein 6.6 6.0 - 8.2 g/dL    Globulin 2.6 1.9 - 3.5 g/dL    A-G Ratio 1.5 g/dL   TROPONIN   Result Value Ref Range    Troponin T <6 6 - 19 ng/L   LIPASE   Result Value Ref Range    Lipase 47 11 - 82 U/L   BETA-HCG QUALITATIVE SERUM   Result Value Ref Range    Beta-Hcg Qualitative Serum Negative Negative   ESTIMATED GFR   Result Value Ref Range    GFR (CKD-EPI) 124 >60 mL/min/1.73 m 2   EKG   Result Value Ref Range    Report       [facility] Emergency Dept.    Test Date:  2023  Pt Name:    ROBERTA VIGIL              Department: ER  MRN:        [id number]                      Room:  Gender:     Female                       Technician: [id number]  :        1995                   Requested By:ER TRIAGE PROTOCOL  Order #:    [id number]                    Reading MD: LOGAN JERNIGAN MD    Measurements  Intervals                                Axis  Rate:       93                           P:          34  NE:          142                          QRS:        -2  QRSD:       99                           T:          14  QT:         351  QTc:        437    Interpretive Statements  Sinus rhythm    Low voltage, precordial leads  Compared to ECG 04/06/2023 16:51:08  Low QRS voltage now present  Electronically Signed On 12- 17:22:52 PST by LOGAN JERNIGAN MD           RADIOLOGY  I have independently interpreted the diagnostic imaging associated with this visit and am waiting the final reading from the radiologist.   My preliminary interpretation is as follows: no edema  Radiologist interpretation:   DX-CHEST-PORTABLE (1 VIEW)   Final Result      No acute cardiopulmonary disease.            COURSE & MEDICAL DECISION MAKING        INITIAL ASSESSMENT, COURSE AND PLAN  Care Narrative: 5:07 PM    Patient presenting with chest pain.  At this point differential includes but not limited to acute pathology such as cardiac process, acute coronary syndrome, pulmonary embolism unlikely given the nature of her pain, no persistent tachycardia, no hypoxemia, Wells low risk, pneumothorax, dissection (this seems less likely at this time given the nature of the pain no ripping or tearing sensation, equal pulses, no neurologic deficits), chest wall pain, costochondritis, muscle strain, potential GI process such as esophageal spasm or reflux.  Given this differential we will proceed with further work-up including diagnostic labs, ECG, x-ray.  Have ordered for toradol to treat symptoms.  Additionally we will plan for observation to monitor symptoms as well as response to therapy.    7:34 PM  Patient is reevaluated, she is comfortable, reports improvement in symptoms, agreeable with discharge             PROBLEM LIST    # Chest pain.  At this point does not appear to represent emergent process, seems highly unlikely to be ACS with negative troponin with 1 days worth of symptoms, unremarkable ECG, heart score of 0, unlikely pulmonary embolism as  above x-ray without evidence of pneumothorax or other pulmonary pathology.  Additional differential was considered as above.      DISPOSITION AND DISCUSSIONS      Barriers to care at this time, including but not limited to:  none .     Decision tools and prescription drugs considered including, but not limited to: HEART Score 0 .    The patient will return for new or worsening symptoms and is stable at the time of discharge.    The patient is referred to a primary physician for blood pressure management, diabetic screening, and for all other preventative health concerns.        DISPOSITION:  Patient will be discharged home in stable condition.    FOLLOW UP:  DARIA HoodPJeannieRJeannieNJeannie  [address]  [facility] [address]  [phone]    In 1 week        OUTPATIENT MEDICATIONS:  New Prescriptions    No medications on file         FINAL DIAGNOSIS  1. Chest pain, unspecified type           Electronically signed by: Melvin Fraire M.D., 12/31/2023 5:06 PM

## 2024-05-16 ENCOUNTER — APPOINTMENT (OUTPATIENT)
Dept: RADIOLOGY | Facility: MEDICAL CENTER | Age: 29
End: 2024-05-16
Attending: EMERGENCY MEDICINE
Payer: MEDICAID

## 2024-05-16 ENCOUNTER — HOSPITAL ENCOUNTER (EMERGENCY)
Facility: MEDICAL CENTER | Age: 29
End: 2024-05-16
Attending: EMERGENCY MEDICINE
Payer: MEDICAID

## 2024-05-16 VITALS
TEMPERATURE: 96.8 F | OXYGEN SATURATION: 96 % | HEIGHT: 72 IN | HEART RATE: 80 BPM | DIASTOLIC BLOOD PRESSURE: 60 MMHG | RESPIRATION RATE: 16 BRPM | BODY MASS INDEX: 32.78 KG/M2 | WEIGHT: 242 LBS | SYSTOLIC BLOOD PRESSURE: 113 MMHG

## 2024-05-16 DIAGNOSIS — S39.012A STRAIN OF LUMBAR REGION, INITIAL ENCOUNTER: ICD-10-CM

## 2024-05-16 RX ORDER — HYDROCODONE BITARTRATE AND ACETAMINOPHEN 5; 325 MG/1; MG/1
1 TABLET ORAL EVERY 6 HOURS PRN
Qty: 10 TABLET | Refills: 0 | Status: SHIPPED | OUTPATIENT
Start: 2024-05-16 | End: 2024-05-20

## 2024-05-16 ASSESSMENT — FIBROSIS 4 INDEX: FIB4 SCORE: 0.41

## 2024-05-16 NOTE — ED PROVIDER NOTES
CHIEF COMPLAINT  Chief Complaint   Patient presents with    Low Back Pain     Pt reports constant low back pain since yesterday. Hx of scoliosis.        LIMITATION TO HISTORY   Select: none    HPI    Mraiel Carty is a 28 y.o. female with a history of scoliosis who presents to the Emergency Department for constant lower back pain onset yesterday and worsening today. Patient denies injuring her back recently. She states she is on her feet often at work. She is prescribed Metoprolol daily, however she reports she has not been taking it because it doesn't help.    OUTSIDE HISTORIAN(S):  Select: None    EXTERNAL RECORDS REVIEWED  Select: None pertinent    PAST MEDICAL HISTORY  Past Medical History:   Diagnosis Date    Scoliosis          SURGICAL HISTORY  History reviewed. No pertinent surgical history.      FAMILY HISTORY  History reviewed. No pertinent family history.       SOCIAL HISTORY  Social History     Tobacco Use    Smoking status: Never    Smokeless tobacco: Never   Vaping Use    Vaping status: Never Used   Substance Use Topics    Alcohol use: Never    Drug use: Never         CURRENT MEDICATIONS  No current facility-administered medications on file prior to encounter.     Current Outpatient Medications on File Prior to Encounter   Medication Sig Dispense Refill    dicyclomine (BENTYL) 20 MG Tab Take 20 mg by mouth every 6 hours.      mirtazapine (REMERON) 30 MG Tab tablet Take 30 mg by mouth every evening.      hydrOXYzine pamoate (VISTARIL) 25 MG Cap Take 25 mg by mouth 3 times a day as needed for Anxiety.      metoprolol SR (TOPROL XL) 50 MG TABLET SR 24 HR Take 50 mg by mouth 2 times a day.      sertraline (ZOLOFT) 100 MG Tab Take 100 mg by mouth every day.      ARIPiprazole (ABILIFY) 5 MG tablet Take 5 mg by mouth every evening.      meloxicam (MOBIC) 7.5 MG Tab Take 1 tablet by mouth every day. 30 tablet 2         ALLERGIES  No Known Allergies    PHYSICAL EXAM  VITAL SIGNS:/85   Pulse 94    "Temp 36 °C (96.8 °F) (Temporal)   Resp 18   Ht 1.854 m (6' 1\")   Wt 110 kg (242 lb)   SpO2 96%   BMI 31.93 kg/m²       Constitutional: Well-developed no acute distress   HENT: Normocephalic, Atraumatic, Bilateral external ears normal.  Eyes:  conjunctiva are normal.   Neck: Supple.  Nontender midline  Cardiovascular: Regular rate and rhythm without murmurs gallops or rubs.   Thorax & Lungs: No respiratory distress. Breathing comfortably. Lungs are clear to auscultation bilaterally, there are no wheezes no rales. Chest wall is nontender.  Abdomen: Soft, non distended, non tender   Skin: Warm, Dry, No erythema,   Back: No CVA tenderness, No midline tenderness,   Musculoskeletal: No clubbing cyanosis or edema. Tenderness about right paraspinus, discomfort with movement, Limited range of motion to 90 ° flexion, full extension  Neurologic: Alert & oriented x 3, normal sensation moving all extremities appears normal. Cranial nerves II-XII grossly intact, moving all 4 extremities, 5/5 strength equal in all 4 extremities, cerebellar functions intact, DTR's 2-3+, no other focal abnormalities.   Psychiatric: Affect normal, Judgment normal, Mood normal.       DIAGNOSTIC STUDIES / PROCEDURES      RADIOLOGY  I have independently interpreted the diagnostic imaging associated with this visit and am waiting the final reading from the radiologist.   My preliminary interpretation is as follows: no Signs of acute fractures.      Radiologist interpretation:   DX-LUMBAR SPINE-2 OR 3 VIEWS   Final Result      Normal complete lumbar spine series.           COURSE & MEDICAL DECISION MAKING    ED COURSE:    ED Observation Status? No, The patient does not qualify for observation status     INTERVENTIONS BY ME:  Medications - No data to display    12:42 PM - Patient seen and examined at bedside. Discussed plan of care, including ordering for imaging to evaluate back pain. Patient agrees to the plan of care. Ordered for DX-Lumbar Spine to " evaluate her symptoms.     1:29 PM - Patient was reevaluated at bedside. Discussed radiology results with the patient and informed them results are reassuring. Discussed plan for discharge, including prescription for pain medication and recommendation to stretch and move around often. Patient verbalizes understanding and agreement to this plan of care.        INITIAL ASSESSMENT, COURSE AND PLAN  Care Narrative: Patient presents emerged part for evaluation.  Clinically patient has more of a back strain on examination.  She has a normal x-ray.  At this point I will give the patient a Norco for pain control recommend range of motion exercises return if any symptoms worsen.            ADDITIONAL PROBLEM LIST  None    DISPOSITION AND DISCUSSIONS    Escalation of care considered, and ultimately not performed: None    Barriers to care at this time, including but not limited to: Is homeless.     Decision tools and prescription drugs considered including, but not limited to: Below.    I reviewed prescription monitoring program for patient's narcotic use before prescribing a scheduled drug.The patient will not drink alcohol nor drive with prescribed medications      In prescribing controlled substances to this patient, I certify that I have obtained and reviewed the medical history this patient I have also made a good eva effort to obtain applicable records from other providers who have treated the patient and records did not demonstrate any increased risk of substance abuse that would prevent me from prescribing controlled substances.     I have conducted a physical exam and documented it. I have reviewed Ms. Carty’s prescription history as maintained by the [place] Prescription Monitoring Program.     I have assessed the patient’s risk for abuse, dependency, and addiction using the validated Opioid Risk Tool available at https://www.mdcalc.com/gwzfir-drcl-ienn-ort-narcotic-abuse.     Given the above, I believe the  benefits of controlled substance therapy outweigh the risks. The reasons for prescribing controlled substances include in my professional opinion, controlled substances are a reasonable choice for this patient. Accordingly, I have discussed the risk and benefits, treatment plan, and alternative therapies with the patient. The patient has been consented for the medication and understands the risks.      DISPOSITION:  Patient will be discharged home in stable condition.    FOLLOW UP:  BLANCHE Hood  [address]  [facility] [address]  [phone]    Schedule an appointment as soon as possible for a visit in 1 week  For re-check, For further treatment      OUTPATIENT MEDICATIONS:  Discharge Medication List as of 5/16/2024  1:34 PM        START taking these medications    Details   HYDROcodone-acetaminophen (NORCO) 5-325 MG Tab per tablet Take 1 Tablet by mouth every 6 hours as needed (pain) for up to 4 days., Disp-10 Tablet, R-0, Normal              FINAL DIAGNOSIS  1. Strain of lumbar region, initial encounter         ILauren (Ralph), am scribing for, and in the presence of, Adama Aguero M.D..    Electronically signed by: Lauren Fenton (Ralph), 5/16/2024    IAdama M.D. personally performed the services described in this documentation, as scribed by Lauren Fenton in my presence, and it is both accurate and complete.     Electronically signed by: Adama Aguero M.D.,3:46 PM 05/16/24

## 2024-05-16 NOTE — ED TRIAGE NOTES
"Chief Complaint   Patient presents with    Low Back Pain     Pt reports constant low back pain since yesterday. Hx of scoliosis.      /85   Pulse 94   Temp 36 °C (96.8 °F) (Temporal)   Resp 18   Ht 1.854 m (6' 1\")   Wt 110 kg (242 lb)   SpO2 96%   BMI 31.93 kg/m²     Pt brought into triage by WC. Educated on triage process. Instructed to notify staff for any worsening symptoms.  "

## 2024-10-27 ENCOUNTER — HOSPITAL ENCOUNTER (EMERGENCY)
Facility: MEDICAL CENTER | Age: 29
End: 2024-10-27
Attending: EMERGENCY MEDICINE
Payer: MEDICAID

## 2024-10-27 ENCOUNTER — APPOINTMENT (OUTPATIENT)
Dept: RADIOLOGY | Facility: MEDICAL CENTER | Age: 29
End: 2024-10-27
Attending: EMERGENCY MEDICINE
Payer: MEDICAID

## 2024-10-27 VITALS
BODY MASS INDEX: 32.1 KG/M2 | HEIGHT: 72 IN | TEMPERATURE: 97 F | WEIGHT: 237 LBS | OXYGEN SATURATION: 94 % | SYSTOLIC BLOOD PRESSURE: 111 MMHG | RESPIRATION RATE: 16 BRPM | DIASTOLIC BLOOD PRESSURE: 76 MMHG | HEART RATE: 98 BPM

## 2024-10-27 DIAGNOSIS — E86.0 DEHYDRATION: ICD-10-CM

## 2024-10-27 DIAGNOSIS — R42 LIGHTHEADEDNESS: ICD-10-CM

## 2024-10-27 LAB
ALBUMIN SERPL BCP-MCNC: 3.6 G/DL (ref 3.2–4.9)
ALBUMIN/GLOB SERPL: 1.4 G/DL
ALP SERPL-CCNC: 57 U/L (ref 30–99)
ALT SERPL-CCNC: 13 U/L (ref 2–50)
ANION GAP SERPL CALC-SCNC: 16 MMOL/L (ref 7–16)
AST SERPL-CCNC: 15 U/L (ref 12–45)
BASOPHILS # BLD AUTO: 0.2 % (ref 0–1.8)
BASOPHILS # BLD: 0.03 K/UL (ref 0–0.12)
BILIRUB SERPL-MCNC: 0.4 MG/DL (ref 0.1–1.5)
BUN SERPL-MCNC: 17 MG/DL (ref 8–22)
CALCIUM ALBUM COR SERPL-MCNC: 9.7 MG/DL (ref 8.5–10.5)
CALCIUM SERPL-MCNC: 9.4 MG/DL (ref 8.5–10.5)
CHLORIDE SERPL-SCNC: 103 MMOL/L (ref 96–112)
CO2 SERPL-SCNC: 18 MMOL/L (ref 20–33)
CREAT SERPL-MCNC: 1.01 MG/DL (ref 0.5–1.4)
EOSINOPHIL # BLD AUTO: 0.03 K/UL (ref 0–0.51)
EOSINOPHIL NFR BLD: 0.2 % (ref 0–6.9)
ERYTHROCYTE [DISTWIDTH] IN BLOOD BY AUTOMATED COUNT: 40.7 FL (ref 35.9–50)
GFR SERPLBLD CREATININE-BSD FMLA CKD-EPI: 77 ML/MIN/1.73 M 2
GLOBULIN SER CALC-MCNC: 2.6 G/DL (ref 1.9–3.5)
GLUCOSE SERPL-MCNC: 141 MG/DL (ref 65–99)
HCG SERPL QL: NEGATIVE
HCT VFR BLD AUTO: 51.7 % (ref 37–47)
HGB BLD-MCNC: 17.4 G/DL (ref 12–16)
IMM GRANULOCYTES # BLD AUTO: 0.08 K/UL (ref 0–0.11)
IMM GRANULOCYTES NFR BLD AUTO: 0.5 % (ref 0–0.9)
LIPASE SERPL-CCNC: 15 U/L (ref 11–82)
LYMPHOCYTES # BLD AUTO: 2.45 K/UL (ref 1–4.8)
LYMPHOCYTES NFR BLD: 14.6 % (ref 22–41)
MCH RBC QN AUTO: 29.1 PG (ref 27–33)
MCHC RBC AUTO-ENTMCNC: 33.7 G/DL (ref 32.2–35.5)
MCV RBC AUTO: 86.6 FL (ref 81.4–97.8)
MONOCYTES # BLD AUTO: 0.55 K/UL (ref 0–0.85)
MONOCYTES NFR BLD AUTO: 3.3 % (ref 0–13.4)
NEUTROPHILS # BLD AUTO: 13.6 K/UL (ref 1.82–7.42)
NEUTROPHILS NFR BLD: 81.2 % (ref 44–72)
NRBC # BLD AUTO: 0 K/UL
NRBC BLD-RTO: 0 /100 WBC (ref 0–0.2)
PLATELET # BLD AUTO: 390 K/UL (ref 164–446)
PMV BLD AUTO: 9.3 FL (ref 9–12.9)
POTASSIUM SERPL-SCNC: 4 MMOL/L (ref 3.6–5.5)
PROT SERPL-MCNC: 6.2 G/DL (ref 6–8.2)
RBC # BLD AUTO: 5.97 M/UL (ref 4.2–5.4)
SODIUM SERPL-SCNC: 137 MMOL/L (ref 135–145)
WBC # BLD AUTO: 16.7 K/UL (ref 4.8–10.8)

## 2024-10-27 PROCEDURE — 80053 COMPREHEN METABOLIC PANEL: CPT

## 2024-10-27 PROCEDURE — 36415 COLL VENOUS BLD VENIPUNCTURE: CPT

## 2024-10-27 PROCEDURE — 83690 ASSAY OF LIPASE: CPT

## 2024-10-27 PROCEDURE — 71045 X-RAY EXAM CHEST 1 VIEW: CPT

## 2024-10-27 PROCEDURE — 84703 CHORIONIC GONADOTROPIN ASSAY: CPT

## 2024-10-27 PROCEDURE — 99284 EMERGENCY DEPT VISIT MOD MDM: CPT

## 2024-10-27 PROCEDURE — 700111 HCHG RX REV CODE 636 W/ 250 OVERRIDE (IP): Mod: UD | Performed by: EMERGENCY MEDICINE

## 2024-10-27 PROCEDURE — 85025 COMPLETE CBC W/AUTO DIFF WBC: CPT

## 2024-10-27 RX ORDER — ONDANSETRON 4 MG/1
4 TABLET, ORALLY DISINTEGRATING ORAL ONCE
Status: COMPLETED | OUTPATIENT
Start: 2024-10-27 | End: 2024-10-27

## 2024-10-27 RX ADMIN — ONDANSETRON 4 MG: 4 TABLET, ORALLY DISINTEGRATING ORAL at 17:55

## 2024-10-27 ASSESSMENT — FIBROSIS 4 INDEX: FIB4 SCORE: 0.41

## 2025-03-08 ENCOUNTER — OFFICE VISIT (OUTPATIENT)
Dept: URGENT CARE | Facility: CLINIC | Age: 30
End: 2025-03-08
Payer: MEDICAID

## 2025-03-08 ENCOUNTER — RESULTS FOLLOW-UP (OUTPATIENT)
Dept: URGENT CARE | Facility: CLINIC | Age: 30
End: 2025-03-08

## 2025-03-08 VITALS
HEART RATE: 100 BPM | SYSTOLIC BLOOD PRESSURE: 102 MMHG | RESPIRATION RATE: 18 BRPM | DIASTOLIC BLOOD PRESSURE: 64 MMHG | BODY MASS INDEX: 32.23 KG/M2 | OXYGEN SATURATION: 99 % | WEIGHT: 238 LBS | TEMPERATURE: 97.1 F | HEIGHT: 72 IN

## 2025-03-08 DIAGNOSIS — J06.9 VIRAL UPPER RESPIRATORY ILLNESS: ICD-10-CM

## 2025-03-08 LAB
FLUAV RNA SPEC QL NAA+PROBE: NEGATIVE
FLUBV RNA SPEC QL NAA+PROBE: NEGATIVE
RSV RNA SPEC QL NAA+PROBE: NEGATIVE
SARS-COV-2 RNA RESP QL NAA+PROBE: NEGATIVE

## 2025-03-08 PROCEDURE — 3074F SYST BP LT 130 MM HG: CPT | Performed by: PHYSICIAN ASSISTANT

## 2025-03-08 PROCEDURE — 87637 SARSCOV2&INF A&B&RSV AMP PRB: CPT | Mod: QW | Performed by: PHYSICIAN ASSISTANT

## 2025-03-08 PROCEDURE — 3078F DIAST BP <80 MM HG: CPT | Performed by: PHYSICIAN ASSISTANT

## 2025-03-08 PROCEDURE — 99203 OFFICE O/P NEW LOW 30 MIN: CPT | Performed by: PHYSICIAN ASSISTANT

## 2025-03-08 RX ORDER — DROSPIRENONE, ETHINYL ESTRADIOL AND LEVOMEFOLATE CALCIUM AND LEVOMEFOLATE CALCIUM 3-0.02(24)
KIT ORAL
COMMUNITY

## 2025-03-08 RX ORDER — METHYLPREDNISOLONE 4 MG/1
4 TABLET ORAL DAILY
COMMUNITY

## 2025-03-08 RX ORDER — MONTELUKAST SODIUM 4 MG/1
4 TABLET, CHEWABLE ORAL NIGHTLY
COMMUNITY

## 2025-03-08 ASSESSMENT — ENCOUNTER SYMPTOMS
CHILLS: 1
COUGH: 1
ABDOMINAL PAIN: 0
DIARRHEA: 0
SINUS PAIN: 0
CONSTIPATION: 0
WHEEZING: 0
SHORTNESS OF BREATH: 0
HEADACHES: 0
DIAPHORESIS: 0
NAUSEA: 0
SORE THROAT: 1
DIZZINESS: 0
FEVER: 1
EYE DISCHARGE: 0
EYE PAIN: 0
VOMITING: 0
EYE REDNESS: 0

## 2025-03-08 ASSESSMENT — FIBROSIS 4 INDEX: FIB4 SCORE: 0.31

## 2025-03-09 NOTE — PROGRESS NOTES
"  Subjective:     Mariel Carty  is a 29 y.o. female who presents for Fatigue, Pharyngitis (Scratchy throat ), Hot Flashes, and Cough (X 3 days )       She presents today for generalized fatigue, scratchy throat, fever/chills and cough ongoing over the last 3 days.  Denies any chest pain or shortness of breath, notes mild nausea but no vomiting.  No abdominal pain, no diarrhea.  Has been around close sick contacts recently.  Not using any over-the-counter medications at this time.       Review of Systems   Constitutional:  Positive for chills, fever and malaise/fatigue. Negative for diaphoresis.   HENT:  Positive for sore throat. Negative for congestion, ear discharge and sinus pain.    Eyes:  Negative for pain, discharge and redness.   Respiratory:  Positive for cough. Negative for shortness of breath and wheezing.    Cardiovascular:  Negative for chest pain.   Gastrointestinal:  Negative for abdominal pain, constipation, diarrhea, nausea and vomiting.   Neurological:  Negative for dizziness and headaches.      No Known Allergies  Past Medical History:   Diagnosis Date    Scoliosis         Objective:   /64   Pulse 100   Temp 36.2 °C (97.1 °F) (Temporal)   Resp 18   Ht 1.854 m (6' 1\")   Wt 108 kg (238 lb)   SpO2 99%   BMI 31.40 kg/m²   Physical Exam  Vitals and nursing note reviewed.   Constitutional:       General: She is not in acute distress.     Appearance: Normal appearance. She is not ill-appearing, toxic-appearing or diaphoretic.   HENT:      Head: Normocephalic.      Right Ear: Tympanic membrane, ear canal and external ear normal. There is no impacted cerumen.      Left Ear: Tympanic membrane, ear canal and external ear normal. There is no impacted cerumen.      Nose: No congestion or rhinorrhea.      Mouth/Throat:      Mouth: Mucous membranes are moist.      Pharynx: No oropharyngeal exudate or posterior oropharyngeal erythema.      Comments: No tonsillar swelling, bilaterally.  No soft " tissue swelling of the sublingual mucosa, no swelling of the soft or hard palate, no unilarteral oral pharynx swelling, no uvular deviation.  Eyes:      General:         Right eye: No discharge.         Left eye: No discharge.      Conjunctiva/sclera: Conjunctivae normal.   Cardiovascular:      Rate and Rhythm: Normal rate and regular rhythm.   Pulmonary:      Effort: Pulmonary effort is normal. No respiratory distress.      Breath sounds: Normal breath sounds. No stridor. No wheezing or rhonchi.   Musculoskeletal:      Cervical back: Neck supple.   Lymphadenopathy:      Cervical: No cervical adenopathy.   Neurological:      General: No focal deficit present.      Mental Status: She is alert and oriented to person, place, and time.   Psychiatric:         Mood and Affect: Mood normal.         Behavior: Behavior normal.         Thought Content: Thought content normal.         Judgment: Judgment normal.             Diagnostic testing:    Cephid COVID/Influenza/RSV -negative, notified via [employer] message    Assessment/Plan:     Encounter Diagnoses   Name Primary?    Viral upper respiratory illness          Obtaining viral panel testing today, this was negative.  Patient is likely suffering from a viral upper respiratory illness, no evidence to support antibiotic use at this time.  Encouraged to use over-the-counter medications for symptom support.  Lung auscultation was normal today, no rales, wheezes, rhonchi.  Vital signs were stable during today's office visit, patient was overall well-appearing. Continue to monitor symptoms and return to urgent care or follow-up with primary care provider if symptoms remain ongoing.  Follow-up in the emergency department if symptoms become severe, ER precautions discussed in office today.    See AVS Instructions below for written guidance provided to patient on after-visit management and care in addition to our verbal discussion during the visit.    Please note that this dictation  was created using voice recognition software. I have attempted to correct all errors, but there may be sound-alike, spelling, grammar and possibly content errors that I did not discover before finalizing the note.    Jarrod Jameson PA-C

## 2025-03-19 ENCOUNTER — HOSPITAL ENCOUNTER (EMERGENCY)
Facility: MEDICAL CENTER | Age: 30
End: 2025-03-19
Attending: EMERGENCY MEDICINE
Payer: OTHER MISCELLANEOUS

## 2025-03-19 ENCOUNTER — PHARMACY VISIT (OUTPATIENT)
Dept: PHARMACY | Facility: MEDICAL CENTER | Age: 30
End: 2025-03-19
Payer: COMMERCIAL

## 2025-03-19 VITALS
SYSTOLIC BLOOD PRESSURE: 142 MMHG | WEIGHT: 228.84 LBS | BODY MASS INDEX: 31 KG/M2 | HEIGHT: 72 IN | DIASTOLIC BLOOD PRESSURE: 85 MMHG | TEMPERATURE: 98.5 F | RESPIRATION RATE: 18 BRPM | HEART RATE: 85 BPM | OXYGEN SATURATION: 95 %
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
## 2025-03-19 DIAGNOSIS — S39.012A BACK STRAIN, INITIAL ENCOUNTER: ICD-10-CM

## 2025-03-19 DIAGNOSIS — S80.00XA CONTUSION OF KNEE, UNSPECIFIED LATERALITY, INITIAL ENCOUNTER: ICD-10-CM

## 2025-03-19 PROCEDURE — 700102 HCHG RX REV CODE 250 W/ 637 OVERRIDE(OP): Mod: UD | Performed by: EMERGENCY MEDICINE

## 2025-03-19 PROCEDURE — A9270 NON-COVERED ITEM OR SERVICE: HCPCS | Mod: UD | Performed by: EMERGENCY MEDICINE

## 2025-03-19 PROCEDURE — 99284 EMERGENCY DEPT VISIT MOD MDM: CPT

## 2025-03-19 PROCEDURE — RXMED WILLOW AMBULATORY MEDICATION CHARGE: Performed by: EMERGENCY MEDICINE

## 2025-03-19 RX ORDER — CYCLOBENZAPRINE HCL 10 MG
10 TABLET ORAL ONCE
Status: COMPLETED | OUTPATIENT
Start: 2025-03-19 | End: 2025-03-19

## 2025-03-19 RX ORDER — IBUPROFEN 400 MG/1
400 TABLET, FILM COATED ORAL EVERY 6 HOURS PRN
Qty: 30 TABLET | Refills: 0 | Status: SHIPPED | OUTPATIENT
Start: 2025-03-19

## 2025-03-19 RX ORDER — CYCLOBENZAPRINE HCL 10 MG
10 TABLET ORAL 3 TIMES DAILY PRN
Qty: 30 TABLET | Refills: 0 | Status: SHIPPED | OUTPATIENT
Start: 2025-03-19

## 2025-03-19 RX ADMIN — CYCLOBENZAPRINE 10 MG: 10 TABLET, FILM COATED ORAL at 14:57

## 2025-03-19 ASSESSMENT — PAIN DESCRIPTION - PAIN TYPE: TYPE: ACUTE PAIN

## 2025-03-19 ASSESSMENT — FIBROSIS 4 INDEX: FIB4 SCORE: 0.31
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
## 2025-04-16 ENCOUNTER — OFFICE VISIT (OUTPATIENT)
Dept: URGENT CARE | Facility: CLINIC | Age: 30
End: 2025-04-16
Payer: MEDICAID

## 2025-04-16 VITALS
WEIGHT: 226.6 LBS | BODY MASS INDEX: 30.69 KG/M2 | TEMPERATURE: 97.5 F | HEIGHT: 72 IN | OXYGEN SATURATION: 97 % | RESPIRATION RATE: 12 BRPM | DIASTOLIC BLOOD PRESSURE: 86 MMHG | HEART RATE: 88 BPM | SYSTOLIC BLOOD PRESSURE: 114 MMHG

## 2025-04-16 DIAGNOSIS — R53.82 CHRONIC FATIGUE: ICD-10-CM

## 2025-04-16 DIAGNOSIS — K59.00 CONSTIPATION, UNSPECIFIED CONSTIPATION TYPE: ICD-10-CM

## 2025-04-16 DIAGNOSIS — R11.0 NAUSEA: ICD-10-CM

## 2025-04-16 PROCEDURE — 99214 OFFICE O/P EST MOD 30 MIN: CPT

## 2025-04-16 PROCEDURE — 3079F DIAST BP 80-89 MM HG: CPT

## 2025-04-16 PROCEDURE — 3074F SYST BP LT 130 MM HG: CPT

## 2025-04-16 ASSESSMENT — ENCOUNTER SYMPTOMS
COUGH: 0
EYES NEGATIVE: 1
NEUROLOGICAL NEGATIVE: 1
NAUSEA: 1
MUSCULOSKELETAL NEGATIVE: 1
CHILLS: 0
BLOOD IN STOOL: 1
FEVER: 0
CONSTIPATION: 1
SHORTNESS OF BREATH: 0
ABDOMINAL PAIN: 0

## 2025-04-16 ASSESSMENT — FIBROSIS 4 INDEX: FIB4 SCORE: 0.31

## 2025-04-16 NOTE — PROGRESS NOTES
Subjective:     Chief Complaint   Patient presents with    Other     Heat flashes, fatigue, constipation, nausea, no appetite, itchiness both arms/chest, x2 years. Sx worsen x3 months.        HPI:  Mariel Carty is a 29 y.o. female who presents for Hot flashes, fatigue, constipation, no appetite, and itchiness. Reports symptoms for the last 2 years but has been getting worse recently. She recently saw PCP at [facility] but did not menton any of these issues to them. She reports that she is due for blood work. Denies fever/chill, SOB, CP, abd pain, vomiting, diarrhea, wt gain.       ROS:  Review of Systems   Constitutional:  Positive for malaise/fatigue. Negative for chills and fever.   HENT: Negative.     Eyes: Negative.    Respiratory:  Negative for cough and shortness of breath.    Cardiovascular:  Negative for chest pain.   Gastrointestinal:  Positive for blood in stool (BRB with BMs), constipation and nausea. Negative for abdominal pain.   Genitourinary: Negative.    Musculoskeletal: Negative.    Skin:  Positive for itching. Negative for rash.   Neurological: Negative.    All other systems reviewed and are negative.       CURRENT MEDICATIONS:  Current Outpatient Medications   Medication Sig Refill Last Dispense    cyclobenzaprine (FLEXERIL) 10 mg Tab Take 1 Tablet by mouth 3 times a day as needed for Muscle Spasms or Moderate Pain. 0 3/19/2025    Drospiren-Eth Estrad-Levomefol 3-0.02-0.451 MG Tab Take  by mouth.  Unknown (patient-reported)    ibuprofen (MOTRIN) 400 MG Tab Take 1 Tablet by mouth every 6 hours as needed for Moderate Pain or Mild Pain. 0 3/19/2025    methylPREDNISolone (MEDROL) 4 MG Tab Take 4 mg by mouth every day.  Unknown (patient-reported)    montelukast (SINGULAIR) 4 MG Chew Tab Chew 4 mg every evening.  Unknown (patient-reported)       ALLERGIES:   No Known Allergies    PROBLEM LIST:    does not have a problem list on file.    Allergies, Medications, & Tobacco/Substance Use were reconciled  "by the Medical Assistant and reviewed by myself.     Objective:   /86 (BP Location: Left arm, Patient Position: Sitting, BP Cuff Size: Adult)   Pulse 88   Temp 36.4 °C (97.5 °F) (Temporal)   Resp 12   Ht 1.854 m (6' 1\")   Wt 103 kg (226 lb 9.6 oz)   SpO2 97%   BMI 29.90 kg/m²     Physical Exam  Constitutional:       General: She is not in acute distress.     Appearance: She is not ill-appearing or toxic-appearing.   HENT:      Right Ear: Tympanic membrane normal.      Left Ear: Tympanic membrane normal.      Nose: Nose normal.      Mouth/Throat:      Mouth: Mucous membranes are moist.      Pharynx: No posterior oropharyngeal erythema.   Eyes:      Pupils: Pupils are equal, round, and reactive to light.   Cardiovascular:      Rate and Rhythm: Normal rate and regular rhythm.   Pulmonary:      Effort: Pulmonary effort is normal.      Breath sounds: Normal breath sounds.   Abdominal:      General: Abdomen is flat. Bowel sounds are normal. There is no distension.      Palpations: Abdomen is soft.      Tenderness: There is no abdominal tenderness.   Skin:     General: Skin is warm and dry.   Neurological:      General: No focal deficit present.      Mental Status: She is alert and oriented to person, place, and time. Mental status is at baseline.         Assessment/Plan:   Pt's history and physical exam consistent with multiple complaints. We discussed each of these complaints and I offered medication to help alleviate these symptoms, but she refuses. I encouraged her to f/u with PCP and possible check thyroid levels, as hypothyroid may be contributing to her constellation of symptoms. She is agreeable to this plan.      Assessment & Plan  Chronic fatigue  - f/u with PCP and get blood work including TSH      Constipation, unspecified constipation type  PT can take over the counter meds, continue medication until soft, regular BMs:  Stool softener (docusate sodium 100mg) one capsule 2 times daily.  Miralax 1 " capful 1-2 times daily until stools become soft.   Milk of magnesia 4 Tbs once by mouth followed by 20-40 oz of water daily.   Glycerin rectal suppository if no relief from above.     Increase water, increased fruits and vegetables, increased exercise  Increased daily fiber intake of 20 to 25 g/day is generally recommended       Nausea  - Increase fluids  - Rest  - Advance diet as tolerated starting with bland, low fat meals. Boiled starches and cereals with salt are indicated in patients with watery diarrhea; crackers, bananas, soup, and boiled vegetables may also be consumed.   - Avoid alcohol, coffee, nicotine, and spicy foods  - Follow up with PCP or clinic in 3 days if not feeling better.               Discussed differential diagnosis, management options, risks/benefits, and alternatives to planned treatment. Pt expressed understanding and the treatment plan was agreed upon. Questions were encouraged and answered. Pt encouraged to return to urgent care as needed if new or worsening symptoms or if there is no improvement in condition. Pt educated in red flags and indications to immediately call 911 or present to the Emergency Department. Advised the patient to follow-up with the primary care physician for recheck, reevaluation, and further management.    I personally reviewed prior external notes and test results pertinent to today's visit. I have independently reviewed and interpreted all diagnostics ordered during this visit.    Please note that this dictation was created using voice recognition software. I have made a reasonable attempt to correct obvious errors, but I expect that there are errors of grammar and possibly content that I did not discover before finalizing the note.    This note was electronically signed by JANI Grayson

## 2025-06-13 ENCOUNTER — HOSPITAL ENCOUNTER (OUTPATIENT)
Dept: RADIOLOGY | Facility: MEDICAL CENTER | Age: 30
End: 2025-06-13
Attending: PSYCHIATRY & NEUROLOGY
Payer: MEDICAID

## 2025-06-13 DIAGNOSIS — E78.2 MIXED HYPERLIPIDEMIA: ICD-10-CM

## 2025-06-13 DIAGNOSIS — R20.2 PARESTHESIA: ICD-10-CM

## 2025-06-13 DIAGNOSIS — R29.6 FALLING: ICD-10-CM

## 2025-06-13 DIAGNOSIS — R25.1 INVOLUNTARY QUIVER: ICD-10-CM

## 2025-06-13 PROCEDURE — 70551 MRI BRAIN STEM W/O DYE: CPT

## 2025-06-21 ENCOUNTER — HOSPITAL ENCOUNTER (EMERGENCY)
Facility: MEDICAL CENTER | Age: 30
End: 2025-06-21
Attending: EMERGENCY MEDICINE
Payer: OTHER MISCELLANEOUS

## 2025-06-21 ENCOUNTER — PHARMACY VISIT (OUTPATIENT)
Dept: PHARMACY | Facility: MEDICAL CENTER | Age: 30
End: 2025-06-21
Payer: COMMERCIAL

## 2025-06-21 VITALS
HEART RATE: 74 BPM | HEIGHT: 72 IN | WEIGHT: 230.16 LBS | SYSTOLIC BLOOD PRESSURE: 128 MMHG | BODY MASS INDEX: 31.17 KG/M2 | RESPIRATION RATE: 19 BRPM | TEMPERATURE: 97.5 F | DIASTOLIC BLOOD PRESSURE: 80 MMHG | OXYGEN SATURATION: 96 %

## 2025-06-21 DIAGNOSIS — S09.90XA CLOSED HEAD INJURY, INITIAL ENCOUNTER: ICD-10-CM

## 2025-06-21 DIAGNOSIS — S13.4XXA WHIPLASH INJURIES, INITIAL ENCOUNTER: ICD-10-CM
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
## 2025-06-21 DIAGNOSIS — S16.1XXA STRAIN OF NECK MUSCLE, INITIAL ENCOUNTER: Primary | ICD-10-CM

## 2025-06-21 PROCEDURE — RXMED WILLOW AMBULATORY MEDICATION CHARGE: Performed by: EMERGENCY MEDICINE

## 2025-06-21 PROCEDURE — 99284 EMERGENCY DEPT VISIT MOD MDM: CPT

## 2025-06-21 RX ORDER — IBUPROFEN 800 MG/1
800 TABLET, FILM COATED ORAL EVERY 8 HOURS PRN
Qty: 9 TABLET | Refills: 0 | Status: SHIPPED | OUTPATIENT
Start: 2025-06-21 | End: 2025-06-24

## 2025-06-21 RX ORDER — ACETAMINOPHEN 500 MG
1000 TABLET ORAL EVERY 6 HOURS PRN
Qty: 20 TABLET | Refills: 0 | Status: SHIPPED | OUTPATIENT
Start: 2025-06-21 | End: 2025-06-25

## 2025-06-21 RX ORDER — LIDOCAINE 50 MG/G
1 PATCH TOPICAL EVERY 24 HOURS
Qty: 5 PATCH | Refills: 0 | Status: SHIPPED | OUTPATIENT
Start: 2025-06-21 | End: 2025-06-26

## 2025-06-21 RX ORDER — CYCLOBENZAPRINE HCL 10 MG
10 TABLET ORAL 3 TIMES DAILY PRN
Qty: 10 TABLET | Refills: 0 | Status: SHIPPED | OUTPATIENT
Start: 2025-06-21 | End: 2025-06-25

## 2025-06-21 ASSESSMENT — PAIN DESCRIPTION - PAIN TYPE: TYPE: ACUTE PAIN

## 2025-06-21 ASSESSMENT — FIBROSIS 4 INDEX: FIB4 SCORE: 0.31

## 2025-06-21 ASSESSMENT — PAIN DESCRIPTION - DESCRIPTORS: DESCRIPTORS: ACHING

## 2025-06-21 NOTE — ED NOTES
.Patient discharged per order. Oral and written discharge instructions reviewed. Medications sent to [facility] pharmacy. New medications reviewed. All belongings accounted for and taken with patient. Questions answered, and patient agrees with discharge plan. Encouraged to follow up with PCP. Patient ambulated to her friends room. Discharged to self care.
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
## 2025-06-23 ENCOUNTER — APPOINTMENT (OUTPATIENT)
Dept: RADIOLOGY | Facility: MEDICAL CENTER | Age: 30
End: 2025-06-23
Attending: EMERGENCY MEDICINE
Payer: OTHER MISCELLANEOUS

## 2025-06-23 ENCOUNTER — HOSPITAL ENCOUNTER (EMERGENCY)
Facility: MEDICAL CENTER | Age: 30
End: 2025-06-23
Attending: EMERGENCY MEDICINE
Payer: OTHER MISCELLANEOUS

## 2025-06-23 VITALS
OXYGEN SATURATION: 100 % | BODY MASS INDEX: 31.15 KG/M2 | TEMPERATURE: 98 F | WEIGHT: 230 LBS | HEIGHT: 72 IN | RESPIRATION RATE: 15 BRPM | DIASTOLIC BLOOD PRESSURE: 81 MMHG | SYSTOLIC BLOOD PRESSURE: 138 MMHG | HEART RATE: 84 BPM

## 2025-06-23 DIAGNOSIS — S16.1XXA STRAIN OF NECK MUSCLE, INITIAL ENCOUNTER: ICD-10-CM

## 2025-06-23 DIAGNOSIS — S09.8XXA BLUNT HEAD TRAUMA, INITIAL ENCOUNTER: Primary | ICD-10-CM

## 2025-06-23 DIAGNOSIS — S06.0X1A CONCUSSION WITH LOSS OF CONSCIOUSNESS OF 30 MINUTES OR LESS, INITIAL ENCOUNTER: ICD-10-CM

## 2025-06-23 DIAGNOSIS — S29.019A THORACIC MYOFASCIAL STRAIN, INITIAL ENCOUNTER: ICD-10-CM

## 2025-06-23 DIAGNOSIS — S39.012A STRAIN OF LUMBAR REGION, INITIAL ENCOUNTER: ICD-10-CM
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
## 2025-06-23 PROCEDURE — A9270 NON-COVERED ITEM OR SERVICE: HCPCS | Mod: UD | Performed by: EMERGENCY MEDICINE

## 2025-06-23 PROCEDURE — 700102 HCHG RX REV CODE 250 W/ 637 OVERRIDE(OP): Mod: UD | Performed by: EMERGENCY MEDICINE

## 2025-06-23 PROCEDURE — 70450 CT HEAD/BRAIN W/O DYE: CPT

## 2025-06-23 PROCEDURE — 99284 EMERGENCY DEPT VISIT MOD MDM: CPT

## 2025-06-23 RX ORDER — IBUPROFEN 600 MG/1
600 TABLET, FILM COATED ORAL ONCE
Status: COMPLETED | OUTPATIENT
Start: 2025-06-23 | End: 2025-06-23

## 2025-06-23 RX ORDER — ACETAMINOPHEN 325 MG/1
650 TABLET ORAL ONCE
Status: COMPLETED | OUTPATIENT
Start: 2025-06-23 | End: 2025-06-23

## 2025-06-23 RX ADMIN — IBUPROFEN 600 MG: 600 TABLET ORAL at 19:28

## 2025-06-23 RX ADMIN — ACETAMINOPHEN 650 MG: 325 TABLET ORAL at 19:28

## 2025-06-23 ASSESSMENT — FIBROSIS 4 INDEX: FIB4 SCORE: 0.31

## 2025-06-23 ASSESSMENT — PAIN DESCRIPTION - PAIN TYPE: TYPE: ACUTE PAIN
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
## 2025-06-24 NOTE — ED NOTES
Discharge education provided. Discharge paperwork reviewed with pt. All questions answered. All belongings with pt. Pt ambulated to lobby unassisted with steady gait.
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
## 2025-07-01 ENCOUNTER — HOSPITAL ENCOUNTER (EMERGENCY)
Facility: MEDICAL CENTER | Age: 30
End: 2025-07-01
Attending: EMERGENCY MEDICINE
Payer: MEDICAID

## 2025-07-01 ENCOUNTER — APPOINTMENT (OUTPATIENT)
Dept: RADIOLOGY | Facility: MEDICAL CENTER | Age: 30
End: 2025-07-01
Attending: EMERGENCY MEDICINE
Payer: MEDICAID

## 2025-07-01 VITALS
HEART RATE: 90 BPM | WEIGHT: 230.16 LBS | RESPIRATION RATE: 16 BRPM | BODY MASS INDEX: 31.17 KG/M2 | DIASTOLIC BLOOD PRESSURE: 77 MMHG | SYSTOLIC BLOOD PRESSURE: 133 MMHG | HEIGHT: 72 IN | TEMPERATURE: 98.5 F | OXYGEN SATURATION: 97 %

## 2025-07-01 DIAGNOSIS — S22.31XA CLOSED FRACTURE OF ONE RIB OF RIGHT SIDE, INITIAL ENCOUNTER: Primary | ICD-10-CM

## 2025-07-01 PROCEDURE — 99283 EMERGENCY DEPT VISIT LOW MDM: CPT

## 2025-07-01 PROCEDURE — 71101 X-RAY EXAM UNILAT RIBS/CHEST: CPT | Mod: RT

## 2025-07-01 RX ORDER — LIDOCAINE 4 G/G
1 PATCH TOPICAL EVERY 24 HOURS
Qty: 10 PATCH | Refills: 0 | Status: SHIPPED | OUTPATIENT
Start: 2025-07-01 | End: 2025-07-11

## 2025-07-01 ASSESSMENT — FIBROSIS 4 INDEX: FIB4 SCORE: 0.31

## 2025-07-01 ASSESSMENT — PAIN DESCRIPTION - PAIN TYPE: TYPE: ACUTE PAIN
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
## 2025-07-02 NOTE — ED NOTES
Pt ambulatory to RAMIN with steady gait. RAMIN process explain to the patient. Pt verbalized understaing. Chart up for ERP.
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
## 2025-07-04 ENCOUNTER — OFFICE VISIT (OUTPATIENT)
Dept: URGENT CARE | Facility: CLINIC | Age: 30
End: 2025-07-04
Payer: MEDICAID

## 2025-07-04 VITALS
WEIGHT: 230 LBS | SYSTOLIC BLOOD PRESSURE: 124 MMHG | RESPIRATION RATE: 16 BRPM | DIASTOLIC BLOOD PRESSURE: 80 MMHG | OXYGEN SATURATION: 95 % | HEART RATE: 88 BPM | HEIGHT: 72 IN | TEMPERATURE: 97.4 F | BODY MASS INDEX: 31.15 KG/M2

## 2025-07-04 DIAGNOSIS — S00.452A FOREIGN BODY OF LEFT EAR LOBE, INITIAL ENCOUNTER: Primary | ICD-10-CM

## 2025-07-04 PROCEDURE — 3074F SYST BP LT 130 MM HG: CPT

## 2025-07-04 PROCEDURE — 3079F DIAST BP 80-89 MM HG: CPT

## 2025-07-04 PROCEDURE — 99213 OFFICE O/P EST LOW 20 MIN: CPT

## 2025-07-04 ASSESSMENT — ENCOUNTER SYMPTOMS
SPUTUM PRODUCTION: 0
SORE THROAT: 0
NAUSEA: 0
FEVER: 0
MYALGIAS: 0
DIZZINESS: 0
CHILLS: 0
COUGH: 0
EYE REDNESS: 0
STRIDOR: 0
VOMITING: 0
PALPITATIONS: 0
EYE PAIN: 0
WHEEZING: 0
DIARRHEA: 0
ABDOMINAL PAIN: 0
SHORTNESS OF BREATH: 0
HEADACHES: 0
EYE DISCHARGE: 0

## 2025-07-04 ASSESSMENT — FIBROSIS 4 INDEX: FIB4 SCORE: 0.31

## 2025-07-04 NOTE — PROGRESS NOTES
Subjective:   Mariel Carty is a 29 y.o. female who presents for Foreign Body in Ear (L ear lobe/ earring piece )          I introduced myself to the patient and informed them that I am a Family Nurse Practitioner.    HPI:Mariel is a 29-year-old female who comes in today c/o ear ring back clasp got stuck and embedded in L ear lobe when she tried to remove her earring. Onset was this am.  She states she has been having pain to the area since.  Patient describes symptoms as constant. They describe the pain as aching. Aggravating factors include pressing on the area. Relieving factors include none. Treatments tried at home include none. They describe their symptoms as mild to moderate. Last Tdap 2020. Denies any F/C/N/V/D        Review of Systems   Constitutional:  Negative for chills, fever and malaise/fatigue.   HENT:  Negative for congestion, ear pain and sore throat.    Eyes:  Negative for pain, discharge and redness.   Respiratory:  Negative for cough, sputum production, shortness of breath, wheezing and stridor.    Cardiovascular:  Negative for chest pain and palpitations.   Gastrointestinal:  Negative for abdominal pain, diarrhea, nausea and vomiting.   Genitourinary:  Negative for dysuria.   Musculoskeletal:  Negative for myalgias.   Skin:  Negative for rash.        Positive for earring backing embedded in left earlobe, mild swelling to the area   Neurological:  Negative for dizziness and headaches.       Medications: reviewed with patient, updated med sheet    Allergies: Lactose    Problem List: does not have a problem list on file.    Surgical History:  Past Surgical History:   Procedure Laterality Date    TONSILLECTOMY         Past Social Hx:   reports that she has never smoked. She has never used smokeless tobacco. She reports that she does not drink alcohol and does not use drugs.     Past Family Hx:   family history is not on file.     Problem list, medications, and allergies reviewed by myself  "today in Epic.   I have documented what I find to be significant in regards to past medical, social, family and surgical history  in my HPI or under PMH/PSH/FH review section, otherwise it is noncontributory     Objective:     /80   Pulse 88   Temp 36.3 °C (97.4 °F) (Temporal)   Resp 16   Ht 1.854 m (6' 1\")   Wt 104 kg (230 lb)   LMP 06/23/2025 (Exact Date)   SpO2 95%   BMI 30.34 kg/m²     During this visit, appropriate PPE was worn, and hand hygiene was performed.    Physical Exam  Vitals reviewed.   Constitutional:       General: She is not in acute distress.     Appearance: Normal appearance. She is normal weight. She is not ill-appearing or toxic-appearing.   HENT:      Head: Normocephalic and atraumatic.      Right Ear: External ear normal.      Left Ear: External ear normal.      Ears:        Nose: No congestion or rhinorrhea.   Eyes:      General: No scleral icterus.        Right eye: No discharge.         Left eye: No discharge.      Extraocular Movements: Extraocular movements intact.      Conjunctiva/sclera: Conjunctivae normal.   Cardiovascular:      Rate and Rhythm: Normal rate.   Pulmonary:      Effort: Pulmonary effort is normal.   Abdominal:      General: There is no distension.   Genitourinary:     Comments: Deferred  Musculoskeletal:         General: No swelling or tenderness. Normal range of motion.      Right lower leg: No edema.      Left lower leg: No edema.   Skin:     General: Skin is warm and dry.      Comments: Exam of left earlobe does show some swelling of the soft tissue, small foreign body is palpable, not visible.  Site is hemostatic.  There is no erythema, warmth to touch, purulent drainage, lymphangitis or any other sign of infection.   Neurological:      General: No focal deficit present.      Mental Status: She is alert and oriented to person, place, and time. Mental status is at baseline.   Psychiatric:         Mood and Affect: Mood normal.         Behavior: Behavior " normal.           Procedure: Removal of earring backing from left earlobe soft tissue    -Risks including bleeding, nerve damage, infection, and poor cosmetic outcome discussed. Benefits and alternatives discussed.  Patient wishes to go ahead with the procedure, verbal consent obtained  -Verified that the patient has no allergies to any local anesthetics or iodine/chlorhexidine  -skin was cleansed in the usual fashion with chlorhexidine x 3, allowed to dry  -Clean technique with sterile instruments used  -Local anesthesia with 1% lidocaine without epinephrine, bleb anesthesia   -After adequate anesthesia was obtained and verified, earlobe was gently folded and pressure placed, this pushed the palpable earring backing to the mouth of the opening in the earlobe whereupon I was able to grasp it with a splinter forceps and gently wiggle it through the piercing opening and retrieve it.  I did show patient the earring backing, she confirms that it is in its entirety.  Soft tissue of the earlobe is now soft to palpation with no further evidence of FB.  No appreciable bleeding, site remained hemostatic throughout  -Polysporin and dressing placed  -Patient tolerated well with no complaints or adverse reactions  -Patient was instructed regarding site care    Assessment/Plan:     Diagnosis and associated orders:     1. Foreign body of left ear lobe, initial encounter           Comments/MDM:     1. Foreign body of left ear lobe, initial encounter (Primary)  Patient's presentation and symptoms as well as physical exam are consistent with backing of earring embedded in left earlobe.  Removal per procedure notes above.  Patient tolerated well.  Site remained hemostatic, no signs of infection.  Patient was instructed regarding site care with antibiotic ointment twice a day to both openings of earlobe for the next several days until area appears healed.  Instructed her to not wear earrings at this time.  discussed with patient Dx,   DDx, management options (risks,benefits, and alternatives to planned treatment), natural progression.   Supportive care measures were discussed.   Questions were encouraged and answered.   Written information was provided and I did go over this with the patient in clinic today.  Instructed patient regarding red flags and to return to urgent care prn if new or worsening sx or if there is no improvement in condition prn.    Advised the patient to follow-up with the primary care physician for recheck, reevaluation, and consideration of further management.  I did instruct patient regarding medications prescribed, purpose, side effects, precautions.  Instructed patient to get a pharmacy consult when picking up any prescribed medications.  Strict ER precautions discussed for any worsening symptoms, including redness, swelling, warmth to touch, pain, purulent drainage, especially in the setting of fever, chills, nausea or vomiting, lethargy   Patient states they have good understanding and they are agreeable with the plan of care.              Pt is clinically stable at today's acute urgent care visit. Vital signs are normal and reassuring.  No acute distress noted. There was no immediate clinical indication for the necessity of emergency department evaluation or inpatient admission.  Appropriate for outpatient management at this time. Patient was amendable to a trial of outpatient management.        I personally reviewed prior external notes and test results pertinent to today's visit.  I have independently reviewed and interpreted all diagnostics ordered during this urgent care acute visit.        Please note that this dictation was created using voice recognition software. I have made a reasonable attempt to correct obvious errors, but I expect that there are errors of grammar and possibly content that I did not discover before finalizing the note.    This note was electronically signed by LUIS Ruffin,  CWS, CFCN